# Patient Record
Sex: FEMALE | Race: WHITE | NOT HISPANIC OR LATINO | URBAN - METROPOLITAN AREA
[De-identification: names, ages, dates, MRNs, and addresses within clinical notes are randomized per-mention and may not be internally consistent; named-entity substitution may affect disease eponyms.]

---

## 2018-09-18 ENCOUNTER — APPOINTMENT (RX ONLY)
Dept: URBAN - METROPOLITAN AREA CLINIC 4 | Facility: CLINIC | Age: 68
Setting detail: DERMATOLOGY
End: 2018-09-18

## 2018-09-18 DIAGNOSIS — L57.0 ACTINIC KERATOSIS: ICD-10-CM

## 2018-09-18 DIAGNOSIS — D18.0 HEMANGIOMA: ICD-10-CM

## 2018-09-18 DIAGNOSIS — D22 MELANOCYTIC NEVI: ICD-10-CM

## 2018-09-18 DIAGNOSIS — L81.4 OTHER MELANIN HYPERPIGMENTATION: ICD-10-CM

## 2018-09-18 DIAGNOSIS — L82.1 OTHER SEBORRHEIC KERATOSIS: ICD-10-CM

## 2018-09-18 DIAGNOSIS — Z71.89 OTHER SPECIFIED COUNSELING: ICD-10-CM

## 2018-09-18 PROBLEM — D22.5 MELANOCYTIC NEVI OF TRUNK: Status: ACTIVE | Noted: 2018-09-18

## 2018-09-18 PROBLEM — D18.01 HEMANGIOMA OF SKIN AND SUBCUTANEOUS TISSUE: Status: ACTIVE | Noted: 2018-09-18

## 2018-09-18 PROCEDURE — 17000 DESTRUCT PREMALG LESION: CPT

## 2018-09-18 PROCEDURE — ? LIQUID NITROGEN

## 2018-09-18 PROCEDURE — ? COUNSELING

## 2018-09-18 PROCEDURE — 99213 OFFICE O/P EST LOW 20 MIN: CPT | Mod: 25

## 2018-09-18 ASSESSMENT — LOCATION DETAILED DESCRIPTION DERM
LOCATION DETAILED: RIGHT CLAVICULAR SKIN
LOCATION DETAILED: LEFT MEDIAL UPPER BACK
LOCATION DETAILED: EPIGASTRIC SKIN
LOCATION DETAILED: LEFT RIB CAGE
LOCATION DETAILED: LEFT MEDIAL SUPERIOR CHEST
LOCATION DETAILED: LEFT LATERAL MALAR CHEEK
LOCATION DETAILED: RIGHT LATERAL FOREHEAD

## 2018-09-18 ASSESSMENT — LOCATION SIMPLE DESCRIPTION DERM
LOCATION SIMPLE: LEFT CHEEK
LOCATION SIMPLE: RIGHT FOREHEAD
LOCATION SIMPLE: ABDOMEN
LOCATION SIMPLE: LEFT UPPER BACK
LOCATION SIMPLE: CHEST
LOCATION SIMPLE: RIGHT CLAVICULAR SKIN

## 2018-09-18 ASSESSMENT — LOCATION ZONE DERM
LOCATION ZONE: FACE
LOCATION ZONE: TRUNK

## 2020-01-23 ENCOUNTER — HOSPITAL ENCOUNTER (OUTPATIENT)
Facility: MEDICAL CENTER | Age: 70
End: 2020-01-23
Attending: ORTHOPAEDIC SURGERY | Admitting: ORTHOPAEDIC SURGERY
Payer: MEDICARE

## 2020-01-23 ENCOUNTER — ANESTHESIA (OUTPATIENT)
Dept: SURGERY | Facility: MEDICAL CENTER | Age: 70
End: 2020-01-23
Payer: MEDICARE

## 2020-01-23 ENCOUNTER — ANESTHESIA EVENT (OUTPATIENT)
Dept: SURGERY | Facility: MEDICAL CENTER | Age: 70
End: 2020-01-23
Payer: MEDICARE

## 2020-01-23 VITALS
SYSTOLIC BLOOD PRESSURE: 112 MMHG | HEART RATE: 65 BPM | DIASTOLIC BLOOD PRESSURE: 63 MMHG | HEIGHT: 64 IN | RESPIRATION RATE: 18 BRPM | BODY MASS INDEX: 23.26 KG/M2 | WEIGHT: 136.24 LBS | OXYGEN SATURATION: 97 % | TEMPERATURE: 97.3 F

## 2020-01-23 PROBLEM — M65.30 TRIGGER FINGER: Status: ACTIVE | Noted: 2020-01-23

## 2020-01-23 PROCEDURE — A6454 SELF-ADHER BAND W>=3" <5"/YD: HCPCS | Performed by: ORTHOPAEDIC SURGERY

## 2020-01-23 PROCEDURE — 160035 HCHG PACU - 1ST 60 MINS PHASE I: Performed by: ORTHOPAEDIC SURGERY

## 2020-01-23 PROCEDURE — 160025 RECOVERY II MINUTES (STATS): Performed by: ORTHOPAEDIC SURGERY

## 2020-01-23 PROCEDURE — 700111 HCHG RX REV CODE 636 W/ 250 OVERRIDE (IP)

## 2020-01-23 PROCEDURE — 700111 HCHG RX REV CODE 636 W/ 250 OVERRIDE (IP): Performed by: ANESTHESIOLOGY

## 2020-01-23 PROCEDURE — 160028 HCHG SURGERY MINUTES - 1ST 30 MINS LEVEL 3: Performed by: ORTHOPAEDIC SURGERY

## 2020-01-23 PROCEDURE — 160002 HCHG RECOVERY MINUTES (STAT): Performed by: ORTHOPAEDIC SURGERY

## 2020-01-23 PROCEDURE — 700105 HCHG RX REV CODE 258: Performed by: ORTHOPAEDIC SURGERY

## 2020-01-23 PROCEDURE — 160048 HCHG OR STATISTICAL LEVEL 1-5: Performed by: ORTHOPAEDIC SURGERY

## 2020-01-23 PROCEDURE — 160009 HCHG ANES TIME/MIN: Performed by: ORTHOPAEDIC SURGERY

## 2020-01-23 PROCEDURE — 700101 HCHG RX REV CODE 250: Performed by: ORTHOPAEDIC SURGERY

## 2020-01-23 PROCEDURE — 501838 HCHG SUTURE GENERAL: Performed by: ORTHOPAEDIC SURGERY

## 2020-01-23 PROCEDURE — 160046 HCHG PACU - 1ST 60 MINS PHASE II: Performed by: ORTHOPAEDIC SURGERY

## 2020-01-23 RX ORDER — ONDANSETRON 2 MG/ML
4 INJECTION INTRAMUSCULAR; INTRAVENOUS
Status: DISCONTINUED | OUTPATIENT
Start: 2020-01-23 | End: 2020-01-23 | Stop reason: HOSPADM

## 2020-01-23 RX ORDER — LORAZEPAM 2 MG/ML
0.5 INJECTION INTRAMUSCULAR
Status: DISCONTINUED | OUTPATIENT
Start: 2020-01-23 | End: 2020-01-23 | Stop reason: HOSPADM

## 2020-01-23 RX ORDER — SODIUM CHLORIDE, SODIUM LACTATE, POTASSIUM CHLORIDE, CALCIUM CHLORIDE 600; 310; 30; 20 MG/100ML; MG/100ML; MG/100ML; MG/100ML
INJECTION, SOLUTION INTRAVENOUS CONTINUOUS
Status: DISCONTINUED | OUTPATIENT
Start: 2020-01-23 | End: 2020-01-23 | Stop reason: HOSPADM

## 2020-01-23 RX ORDER — LABETALOL HYDROCHLORIDE 5 MG/ML
5 INJECTION, SOLUTION INTRAVENOUS
Status: DISCONTINUED | OUTPATIENT
Start: 2020-01-23 | End: 2020-01-23 | Stop reason: HOSPADM

## 2020-01-23 RX ORDER — MEPERIDINE HYDROCHLORIDE 25 MG/ML
12.5 INJECTION INTRAMUSCULAR; INTRAVENOUS; SUBCUTANEOUS
Status: DISCONTINUED | OUTPATIENT
Start: 2020-01-23 | End: 2020-01-23 | Stop reason: HOSPADM

## 2020-01-23 RX ORDER — LIDOCAINE HYDROCHLORIDE 10 MG/ML
INJECTION, SOLUTION EPIDURAL; INFILTRATION; INTRACAUDAL; PERINEURAL
Status: COMPLETED
Start: 2020-01-23 | End: 2020-01-23

## 2020-01-23 RX ORDER — HYDROMORPHONE HYDROCHLORIDE 1 MG/ML
0.2 INJECTION, SOLUTION INTRAMUSCULAR; INTRAVENOUS; SUBCUTANEOUS
Status: DISCONTINUED | OUTPATIENT
Start: 2020-01-23 | End: 2020-01-23 | Stop reason: HOSPADM

## 2020-01-23 RX ORDER — CEFAZOLIN SODIUM 1 G/3ML
INJECTION, POWDER, FOR SOLUTION INTRAMUSCULAR; INTRAVENOUS PRN
Status: DISCONTINUED | OUTPATIENT
Start: 2020-01-23 | End: 2020-01-23 | Stop reason: SURG

## 2020-01-23 RX ORDER — ONDANSETRON 2 MG/ML
INJECTION INTRAMUSCULAR; INTRAVENOUS PRN
Status: DISCONTINUED | OUTPATIENT
Start: 2020-01-23 | End: 2020-01-23 | Stop reason: SURG

## 2020-01-23 RX ORDER — HYDROMORPHONE HYDROCHLORIDE 1 MG/ML
0.4 INJECTION, SOLUTION INTRAMUSCULAR; INTRAVENOUS; SUBCUTANEOUS
Status: DISCONTINUED | OUTPATIENT
Start: 2020-01-23 | End: 2020-01-23 | Stop reason: HOSPADM

## 2020-01-23 RX ORDER — KETOROLAC TROMETHAMINE 30 MG/ML
INJECTION, SOLUTION INTRAMUSCULAR; INTRAVENOUS PRN
Status: DISCONTINUED | OUTPATIENT
Start: 2020-01-23 | End: 2020-01-23 | Stop reason: SURG

## 2020-01-23 RX ORDER — HALOPERIDOL 5 MG/ML
1 INJECTION INTRAMUSCULAR
Status: DISCONTINUED | OUTPATIENT
Start: 2020-01-23 | End: 2020-01-23 | Stop reason: HOSPADM

## 2020-01-23 RX ORDER — HYDROMORPHONE HYDROCHLORIDE 1 MG/ML
0.1 INJECTION, SOLUTION INTRAMUSCULAR; INTRAVENOUS; SUBCUTANEOUS
Status: DISCONTINUED | OUTPATIENT
Start: 2020-01-23 | End: 2020-01-23 | Stop reason: HOSPADM

## 2020-01-23 RX ORDER — BUPIVACAINE HYDROCHLORIDE 5 MG/ML
INJECTION, SOLUTION EPIDURAL; INTRACAUDAL
Status: DISCONTINUED | OUTPATIENT
Start: 2020-01-23 | End: 2020-01-23 | Stop reason: HOSPADM

## 2020-01-23 RX ORDER — HYDRALAZINE HYDROCHLORIDE 20 MG/ML
5 INJECTION INTRAMUSCULAR; INTRAVENOUS
Status: DISCONTINUED | OUTPATIENT
Start: 2020-01-23 | End: 2020-01-23 | Stop reason: HOSPADM

## 2020-01-23 RX ADMIN — ONDANSETRON 4 MG: 2 INJECTION INTRAMUSCULAR; INTRAVENOUS at 08:04

## 2020-01-23 RX ADMIN — KETOROLAC TROMETHAMINE 30 MG: 30 INJECTION, SOLUTION INTRAMUSCULAR at 08:04

## 2020-01-23 RX ADMIN — LIDOCAINE HYDROCHLORIDE 0.5 ML: 10 INJECTION, SOLUTION EPIDURAL; INFILTRATION; INTRACAUDAL; PERINEURAL at 07:00

## 2020-01-23 RX ADMIN — Medication 0.5 ML: at 07:00

## 2020-01-23 RX ADMIN — SODIUM CHLORIDE, POTASSIUM CHLORIDE, SODIUM LACTATE AND CALCIUM CHLORIDE: 600; 310; 30; 20 INJECTION, SOLUTION INTRAVENOUS at 07:23

## 2020-01-23 RX ADMIN — PROPOFOL 50 MG: 10 INJECTION, EMULSION INTRAVENOUS at 07:56

## 2020-01-23 RX ADMIN — PROPOFOL 50 MG: 10 INJECTION, EMULSION INTRAVENOUS at 07:59

## 2020-01-23 RX ADMIN — CEFAZOLIN 2 G: 330 INJECTION, POWDER, FOR SOLUTION INTRAMUSCULAR; INTRAVENOUS at 07:56

## 2020-01-23 RX ADMIN — PROPOFOL 50 MG: 10 INJECTION, EMULSION INTRAVENOUS at 08:04

## 2020-01-23 RX ADMIN — PROPOFOL 50 MG: 10 INJECTION, EMULSION INTRAVENOUS at 07:54

## 2020-01-23 ASSESSMENT — PAIN SCALES - GENERAL: PAIN_LEVEL: 0

## 2020-01-23 NOTE — ANESTHESIA POSTPROCEDURE EVALUATION
Patient: Jovana Black    Procedure Summary     Date:  01/23/20 Room / Location:  [place] [address] / SURGERY [facility]    Anesthesia Start:  0752 Anesthesia Stop:  0812    Procedure:  RELEASE, TRIGGER FINGER-MIDDLE (Left Finger) Diagnosis:  (TRIGGER FINGER LEFT MIDDLE)    Surgeon:  Daniel Gonzalez M.D. Responsible Provider:  Valentina Coburn M.D.    Anesthesia Type:  MAC ASA Status:  1          Final Anesthesia Type: MAC  Last vitals  BP   Blood Pressure : 112/63    Temp   36.3 °C (97.3 °F)    Pulse   Pulse: 65   Resp   18    SpO2   97 %      Anesthesia Post Evaluation    Patient location during evaluation: PACU  Patient participation: complete - patient participated  Level of consciousness: awake and alert  Pain score: 0    Airway patency: patent  Anesthetic complications: no  Cardiovascular status: adequate  Respiratory status: acceptable  Hydration status: acceptable    PONV: none           Nurse Pain Score: 0 (NPRS)

## 2020-01-23 NOTE — ANESTHESIA PREPROCEDURE EVALUATION
Relevant Problems   No relevant active problems       Physical Exam    Airway   Mallampati: I  TM distance: >3 FB       Cardiovascular - normal exam     Dental    Pulmonary   Breath sounds clear to auscultation     Abdominal    Neurological - normal exam                 Anesthesia Plan    ASA 1       Plan - MAC             Induction: intravenous      Pertinent diagnostic labs and testing reviewed    Informed Consent:    Anesthetic plan and risks discussed with patient.

## 2020-01-23 NOTE — PROGRESS NOTES
Patient received from OR at 0810, bedside report received from anesthesia/OR RN, 2 patient identifiers confirmed . Patient connected to monitors, assessed for general head to toe and pain/nausea. Ordered reviewed and checked. family updated via telephone on patient status.  VSS, declines pain, awake and alert, patient meets criteria for D/C to phase II. Report called to Yumiko GILMAN.

## 2020-01-23 NOTE — ANESTHESIA QCDR
2019 [facility] Clinical Data Registry (for Quality Improvement)     Postoperative nausea/vomiting risk protocol (Adult = 18 yrs and Pediatric 3-17 yrs)- (430 and 463)  General inhalation anesthetic (NOT TIVA) with PONV risk factors: No  Provision of anti-emetic therapy with at least 2 different classes of agents: N/A  Patient DID NOT receive anti-emetic therapy and reason is documented in Medical Record: N/A    Multimodal Pain Management- (477)  Non-emergent surgery AND patient age >= 18: Yes  Use of Multimodal Pain Management, two or more drugs and/or interventions, NOT including systemic opioids: Yes  Exception: Documented allergy to multiple classes of analgesics: N/A    Smoking Abstinence (404)  Patient is current smoker (cigarette, pipe, e-cig, marijuanna): No  Elective Surgery:   Abstinence instructions provided prior to day of surgery:   Patient abstained from smoking on day of surgery:     Pre-Op Beta-Blocker in Isolated CABG (44)  Isolated CABG AND patient age >= 18: No  Beta-blocker admin within 24 hours of surgical incision:   Exception:of medical reason(s) for not administering beta blocker within 24 hours prior to surgical incision (e.g., not  indicated,other medical reason):     PACU assessment of acute postoperative pain prior to Anesthesia Care End- Applies to Patients Age = 18- (ABG7)  Initial PACU pain score is which of the following: < 7/10  Patient unable to report pain score: N/A    Post-anesthetic transfer of care checklist/protocol to PACU/ICU- (426 and 427)  Upon conclusion of case, patient transferred to which of the following locations: PACU/Non-ICU  Use of transfer checklist/protocol: Yes  Exclusion: Service Performed in Patient Hospital Room (and thus did not require transfer): N/A  Unplanned admission to ICU related to anesthesia service up through end of PACU care- (MD51)  Unplanned admission to ICU (not initially anticipated at anesthesia start time): No

## 2020-01-23 NOTE — OR NURSING
Pt to phase 2 by gissel mccann, vss. Dressing to left hand is clean dry and intact. Pt able to move all fingers, cap refill is WNL.  at bedside, instructions and script given. To car by wheelchair.

## 2020-01-23 NOTE — ANESTHESIA TIME REPORT
Anesthesia Start and Stop Event Times     Date Time Event    1/23/2020 0742 Ready for Procedure     0752 Anesthesia Start     0812 Anesthesia Stop        Responsible Staff  01/23/20    Name Role Begin End    Valentina Coburn M.D. Anesth 0752 0812        Preop Diagnosis (Free Text):  Pre-op Diagnosis     TRIGGER FINGER LEFT MIDDLE        Preop Diagnosis (Codes):    Post op Diagnosis  Trigger finger      Premium Reason  Non-Premium    Comments:

## 2020-01-23 NOTE — DISCHARGE INSTRUCTIONS
ACTIVITY: Rest and take it easy for the first 24 hours.  A responsible adult is recommended to remain with you during that time.  It is normal to feel sleepy.  We encourage you to not do anything that requires balance, judgment or coordination.    MILD FLU-LIKE SYMPTOMS ARE NORMAL. YOU MAY EXPERIENCE GENERALIZED MUSCLE ACHES, THROAT IRRITATION, HEADACHE AND/OR SOME NAUSEA.    FOR 24 HOURS DO NOT:  Drive, operate machinery or run household appliances.  Drink beer or alcoholic beverages.   Make important decisions or sign legal documents.    SPECIAL INSTRUCTIONS: *FOLLOW ANY INSTRUCTIONS GIVEN TO YOU BY DR. PAZ.    See pain management handout    Elevate extremity   Encourage moving all fingers  Weight Bearing - As Tolerated      DIET: To avoid nausea, slowly advance diet as tolerated, avoiding spicy or greasy foods for the first day.  Add more substantial food to your diet according to your physician's instructions.  Babies can be fed formula or breast milk as soon as they are hungry.  INCREASE FLUIDS AND FIBER TO AVOID CONSTIPATION.    SURGICAL DRESSING/BATHING:   Keep dressing clean and dry   May remove dressing 5-6 days and shower       FOLLOW-UP APPOINTMENT:  A follow-up appointment should be arranged with your doctor; call to schedule  Dr. Paz [phone].    You should CALL YOUR PHYSICIAN if you develop:  Fever greater than 101 degrees F.  Pain not relieved by medication, or persistent nausea or vomiting.  Excessive bleeding (blood soaking through dressing) or unexpected drainage from the wound.  Extreme redness or swelling around the incision site, drainage of pus or foul smelling drainage.  Inability to urinate or empty your bladder within 8 hours.  Problems with breathing or chest pain.    You should call 911 if you develop problems with breathing or chest pain.  If you are unable to contact your doctor or surgical center, you should go to the nearest emergency room or urgent care center.   Physician's telephone #: Dr. Gonzalez [phone]    If any questions arise, call your doctor.  If your doctor is not available, please feel free to call the Surgical Center at [phone].  The Center is open Monday through Friday from 7AM to 7PM.  You can also call the HEALTH HOTLINE open 24 hours/day, 7 days/week and speak to a nurse at [phone], or toll free at [phone].    A registered nurse may call you a few days after your surgery to see how you are doing after your procedure.    MEDICATIONS: Resume taking daily medication.  Take prescribed pain medication with food.  If no medication is prescribed, you may take non-aspirin pain medication if needed.  PAIN MEDICATION CAN BE VERY CONSTIPATING.  Take a stool softener or laxative such as senokot, pericolace, or milk of magnesia if needed.    Prescription given for Ultram.  Last pain medication given at (no medication given)    If your physician has prescribed pain medication that includes Acetaminophen (Tylenol), do not take additional Acetaminophen (Tylenol) while taking the prescribed medication.    Depression / Suicide Risk    As you are discharged from this [facility] facility, it is important to learn how to keep safe from harming yourself.    Recognize the warning signs:  · Abrupt changes in personality, positive or negative- including increase in energy   · Giving away possessions  · Change in eating patterns- significant weight changes-  positive or negative  · Change in sleeping patterns- unable to sleep or sleeping all the time   · Unwillingness or inability to communicate  · Depression  · Unusual sadness, discouragement and loneliness  · Talk of wanting to die  · Neglect of personal appearance   · Rebelliousness- reckless behavior  · Withdrawal from people/activities they love  · Confusion- inability to concentrate     If you or a loved one observes any of these behaviors or has concerns about self-harm, here's what you can  do:  · Talk about it- your feelings and reasons for harming yourself  · Remove any means that you might use to hurt yourself (examples: pills, rope, extension cords, firearm)  · Get professional help from the community (Mental Health, Substance Abuse, psychological counseling)  · Do not be alone:Call your Safe Contact- someone whom you trust who will be there for you.  · Call your local CRISIS HOTLINE 951-1203 or 235-292-7552  · Call your local Children's Mobile Crisis Response Team [place] (744) 966-2115 or www.PharmAssistant  · Call the toll free National Suicide Prevention Hotlines   · National Suicide Prevention Lifeline 489-163-FOCR (2603)  · National Hope Line Network 800-SUICIDE (054-0668)

## 2020-01-23 NOTE — OR SURGEON
Immediate Post OP Note    PreOp Diagnosis: L mf trigger    PostOp Diagnosis: same    Procedure(s):  RELEASE, TRIGGER FINGER-MIDDLE    Surgeon(s):  Daniel Gonzalez M.D.    Anesthesiologist/Type of Anesthesia:  Anesthesiologist: Valentina Coburn M.D./* No anesthesia type entered *    Surgical Staff:  Circulator: Esther Pro R.N.  Scrub Person: Belia Davidson; Namrata Jasso R.N.    Specimens removed if any:  * No specimens in log *    Estimated Blood Loss: min    Findings: adequate release    Complications: none        1/23/2020 7:42 AM Daniel Gonzalez M.D.

## 2020-01-23 NOTE — OP REPORT
DATE OF SERVICE:  01/23/2020    PREOPERATIVE DIAGNOSIS:  Left middle trigger finger.    POSTOPERATIVE DIAGNOSIS:  Left middle trigger finger.    PROCEDURE:  Left middle trigger release.    SURGEON:  Daniel Gonzalez MD    ANESTHESIA:  IV sedation with local.    ESTIMATED BLOOD LOSS:  Minimal.    DRAINS:  None.    COMPLICATIONS:  None.    INDICATIONS:  Patient is a female with symptoms of trigger finger ____.  I   explained the risks, benefits, complications, and alternatives to surgery.    All questions were answered.  No guarantees were given.  Signed consent was   obtained.      DESCRIPTION OF PROCEDURE:  Patient was taken to the operating room, laid   supine on the operating table.  All bony prominences well padded.  Good IV   sedation was given.  Left upper extremity was prepped and draped in the usual   sterile fashion using ChloraPrep.  Limb was exsanguinated with elevation.    Tourniquet was raised.  Total tourniquet time was under 15 minutes.  The area   was infiltrated with Marcaine and lidocaine, both without epinephrine.  An   oblique incision was made over left middle finger, A1 pulley was identified,   sharply incised, carried proximally and distally, found to be a nice release   along its entirety.  The tendon tracked nicely with no locking, catching.    Wound was irrigated, closed with nylon in simple fashion.  Sterile dressing of   Xeroform, 4x4, Sof-Rol, bias was applied.  All needle, sponge counts were   correct.  Patient tolerated procedure well and was transferred to recovery in   stable condition.       ____________________________________     MD GAYATRI ORTEGA / SANDRA    DD:  01/23/2020 11:09:52  DT:  01/23/2020 11:41:06    D#:  [id number]  Job#:  [id number]

## 2020-02-18 ENCOUNTER — APPOINTMENT (RX ONLY)
Dept: URBAN - NONMETROPOLITAN AREA CLINIC 1 | Facility: CLINIC | Age: 70
Setting detail: DERMATOLOGY
End: 2020-02-18

## 2020-02-18 DIAGNOSIS — D22 MELANOCYTIC NEVI: ICD-10-CM

## 2020-02-18 DIAGNOSIS — L57.0 ACTINIC KERATOSIS: ICD-10-CM

## 2020-02-18 DIAGNOSIS — D485 NEOPLASM OF UNCERTAIN BEHAVIOR OF SKIN: ICD-10-CM

## 2020-02-18 DIAGNOSIS — D18.0 HEMANGIOMA: ICD-10-CM

## 2020-02-18 PROBLEM — D22.71 MELANOCYTIC NEVI OF RIGHT LOWER LIMB, INCLUDING HIP: Status: ACTIVE | Noted: 2020-02-18

## 2020-02-18 PROBLEM — D48.5 NEOPLASM OF UNCERTAIN BEHAVIOR OF SKIN: Status: ACTIVE | Noted: 2020-02-18

## 2020-02-18 PROBLEM — D22.5 MELANOCYTIC NEVI OF TRUNK: Status: ACTIVE | Noted: 2020-02-18

## 2020-02-18 PROBLEM — D18.01 HEMANGIOMA OF SKIN AND SUBCUTANEOUS TISSUE: Status: ACTIVE | Noted: 2020-02-18

## 2020-02-18 PROCEDURE — 17000 DESTRUCT PREMALG LESION: CPT | Mod: 59

## 2020-02-18 PROCEDURE — ? COUNSELING

## 2020-02-18 PROCEDURE — ? LIQUID NITROGEN

## 2020-02-18 PROCEDURE — 99203 OFFICE O/P NEW LOW 30 MIN: CPT | Mod: 25

## 2020-02-18 PROCEDURE — 17003 DESTRUCT PREMALG LES 2-14: CPT

## 2020-02-18 PROCEDURE — ? OBSERVATION

## 2020-02-18 PROCEDURE — ? BIOPSY BY SHAVE METHOD

## 2020-02-18 PROCEDURE — 11102 TANGNTL BX SKIN SINGLE LES: CPT

## 2020-02-18 ASSESSMENT — LOCATION ZONE DERM
LOCATION ZONE: TRUNK
LOCATION ZONE: LEG
LOCATION ZONE: EAR
LOCATION ZONE: FACE
LOCATION ZONE: TOE

## 2020-02-18 ASSESSMENT — LOCATION DETAILED DESCRIPTION DERM
LOCATION DETAILED: PERIUMBILICAL SKIN
LOCATION DETAILED: RIGHT SUPERIOR HELIX
LOCATION DETAILED: RIGHT INFERIOR MEDIAL LOWER BACK
LOCATION DETAILED: LEFT SUPERIOR FOREHEAD
LOCATION DETAILED: RIGHT INFERIOR LATERAL FOREHEAD
LOCATION DETAILED: RIGHT PROXIMAL PRETIBIAL REGION
LOCATION DETAILED: RIGHT DORSAL 3RD TOE

## 2020-02-18 ASSESSMENT — LOCATION SIMPLE DESCRIPTION DERM
LOCATION SIMPLE: ABDOMEN
LOCATION SIMPLE: RIGHT EAR
LOCATION SIMPLE: RIGHT 3RD TOE
LOCATION SIMPLE: LEFT FOREHEAD
LOCATION SIMPLE: RIGHT FOREHEAD
LOCATION SIMPLE: RIGHT LOWER BACK
LOCATION SIMPLE: RIGHT PRETIBIAL REGION

## 2020-02-18 NOTE — PROCEDURE: BIOPSY BY SHAVE METHOD
Body Location Override (Optional - Billing Will Still Be Based On Selected Body Map Location If Applicable): R medial lower leg
Detail Level: Detailed
Depth Of Biopsy: dermis
Was A Bandage Applied: Yes
Size Of Lesion In Cm: 0.3
X Size Of Lesion In Cm: 0.2
Biopsy Type: H and E
Biopsy Method: Dermablade
Anesthesia Type: 1% lidocaine with epinephrine and a 1:10 solution of 8.4% sodium bicarbonate
Anesthesia Volume In Cc: 1
Additional Anesthesia Volume In Cc (Will Not Render If 0): 0
Hemostasis: Drysol
Wound Care: Vaseline
Dressing: Band-Aid
Destruction After The Procedure: No
Type Of Destruction Used: Electrodesiccation and Curettage
Curettage Text: The wound bed was treated with curettage after the biopsy was done.
Cryotherapy Text: The wound bed was treated with cryotherapy after the biopsy was performed.
Electrodesiccation Text: The wound bed was treated with electrodesiccation after the biopsy was performed.
Electrodesiccation And Curettage Text: The wound bed was treated with electrodesiccation and curettage after the biopsy was performed.
Silver Nitrate Text: The wound bed was treated with silver nitrate after the biopsy was performed.
Lab: [id number]
Lab Facility: [id number]
Consent: Written consent was obtained and risks were reviewed including but not limited to scarring, infection, bleeding, scabbing, incomplete removal, nerve damage and allergy to anesthesia.
Post-Care Instructions: I reviewed with the patient in detail post-care instructions. Patient is to keep the biopsy site dry overnight, and then apply Polysporin twice daily until healed. Patient may apply hydrogen peroxide soaks to remove any crusting.
Notification Instructions: Patient will be notified of biopsy results. However, patient instructed to call the office if not contacted within 2 weeks.
Billing Type: Third-Party Bill

## 2020-02-18 NOTE — PROCEDURE: LIQUID NITROGEN
Render Note In Bullet Format When Appropriate: No
Number Of Freeze-Thaw Cycles: 2 freeze-thaw cycles
Post-Care Instructions: I reviewed with the patient in detail post-care instructions. Patient is to wear sunprotection, and avoid picking at any of the treated lesions. Pt may apply Vaseline to crusted or scabbing areas.
Consent: The patient's consent was obtained including but not limited to risks of crusting, scabbing, blistering, scarring, darker or lighter pigmentary change, recurrence, incomplete removal and infection.
Duration Of Freeze Thaw-Cycle (Seconds): 0
Detail Level: Simple

## 2020-02-18 NOTE — PROCEDURE: OBSERVATION
Body Location Override (Optional - Billing Will Still Be Based On Selected Body Map Location If Applicable): Mid upper abdomen
Detail Level: Detailed
Size Of Lesion In Cm (Optional): 0.3
X Size Of Lesion In Cm (Optional): 0.2
Morphology Per Location (Optional): Even brown macule
Body Location Override (Optional - Billing Will Still Be Based On Selected Body Map Location If Applicable): R dorsal 3rd toe

## 2020-03-18 ENCOUNTER — APPOINTMENT (RX ONLY)
Dept: URBAN - NONMETROPOLITAN AREA CLINIC 1 | Facility: CLINIC | Age: 70
Setting detail: DERMATOLOGY
End: 2020-03-18

## 2020-03-18 DIAGNOSIS — D485 NEOPLASM OF UNCERTAIN BEHAVIOR OF SKIN: ICD-10-CM

## 2020-03-18 PROBLEM — D48.5 NEOPLASM OF UNCERTAIN BEHAVIOR OF SKIN: Status: ACTIVE | Noted: 2020-03-18

## 2020-03-18 PROCEDURE — ? COUNSELING

## 2020-03-18 PROCEDURE — ? EXCISION

## 2020-03-18 PROCEDURE — ? PRESCRIPTION

## 2020-03-18 PROCEDURE — 11402 EXC TR-EXT B9+MARG 1.1-2 CM: CPT

## 2020-03-18 ASSESSMENT — LOCATION ZONE DERM: LOCATION ZONE: LEG

## 2020-03-18 ASSESSMENT — LOCATION DETAILED DESCRIPTION DERM: LOCATION DETAILED: RIGHT MEDIAL PROXIMAL PRETIBIAL REGION

## 2020-03-18 ASSESSMENT — LOCATION SIMPLE DESCRIPTION DERM: LOCATION SIMPLE: RIGHT PRETIBIAL REGION

## 2020-03-18 NOTE — PROCEDURE: EXCISION
Medical Necessity Information: It is in your best interest to select a reason for this procedure from the list below. All of these items fulfill various CMS LCD requirements except lesion extends to a margin.
Include Z78.9 (Other Specified Conditions Influencing Health Status) As An Associated Diagnosis?: No
Medical Necessity Clause: This procedure was medically necessary because the lesion that was treated was:
Lab: [id number]
Lab Facility: [id number]
Body Location Override (Optional - Billing Will Still Be Based On Selected Body Map Location If Applicable): right medial lower leg
Previous Accession (Optional): [id number]
Date Of Previous Biopsy (Optional): 2/18/2020
Biopsy Photograph Reviewed: Yes
Size Of Lesion In Cm: 0.7
X Size Of Lesion In Cm (Optional): 0.5
Anesthesia Volume In Cc: 9
Excision Method: Fusiform
Repair Type: None (Simple)
Intermediate / Complex Repair - Final Wound Length In Cm: 4
Undermining Type: Entire Wound
Debridement Text: The wound edges were debrided prior to proceeding with the closure to facilitate wound healing.
Helical Rim Text: The closure involved the helical rim.
Vermilion Border Text: The closure involved the vermilion border.
Nostril Rim Text: The closure involved the nostril rim.
Retention Suture Text: Retention sutures were placed to support the closure and prevent dehiscence.
Primary Defect Length (In Cm): 0
Suture Removal: 14 days
Epidermal Closure Graft Donor Site (Optional): simple interrupted
Graft Donor Site Bandage (Optional-Leave Blank If You Don't Want In Note): Steri-strips and a pressure bandage were applied to the donor site.
Detail Level: Detailed
Excision Depth: adipose tissue
Scalpel Size: 15 blade
Anesthesia Type: 1% lidocaine with epinephrine and a 1:10 solution of 8.4% sodium bicarbonate
Additional Anesthesia Volume In Cc: 6
Hemostasis: Electrocautery
Estimated Blood Loss (Cc): minimal
Epidermal Sutures: 4-0 Prolene
Wound Care: Polysporin ointment
Dressing: dry sterile dressing
Complex Repair Preamble Text (Leave Blank If You Do Not Want): Extensive wide undermining was performed.
Intermediate Repair Preamble Text (Leave Blank If You Do Not Want): Undermining was performed with blunt dissection.
Fusiform Excision Additional Text (Leave Blank If You Do Not Want): The margin was drawn around the clinically apparent lesion.  A fusiform shape was then drawn on the skin incorporating the lesion and margins.  Incisions were then made along these lines to the appropriate tissue plane and the lesion was extirpated.
Eliptical Excision Additional Text (Leave Blank If You Do Not Want): The margin was drawn around the clinically apparent lesion.  An elliptical shape was then drawn on the skin incorporating the lesion and margins.  Incisions were then made along these lines to the appropriate tissue plane and the lesion was extirpated.
Saucerization Excision Additional Text (Leave Blank If You Do Not Want): The margin was drawn around the clinically apparent lesion.  Incisions were then made along these lines, in a tangential fashion, to the appropriate tissue plane and the lesion was extirpated.
Slit Excision Additional Text (Leave Blank If You Do Not Want): A linear line was drawn on the skin overlying the lesion. An incision was made slowly until the lesion was visualized.  Once visualized, the lesion was removed with blunt dissection.
Excisional Biopsy Additional Text (Leave Blank If You Do Not Want): The margin was drawn around the clinically apparent lesion. An elliptical shape was then drawn on the skin incorporating the lesion and margins.  Incisions were then made along these lines to the appropriate tissue plane and the lesion was extirpated.
Perilesional Excision Additional Text (Leave Blank If You Do Not Want): The margin was drawn around the clinically apparent lesion. Incisions were then made along these lines to the appropriate tissue plane and the lesion was extirpated.
Repair Performed By Another Provider Text (Leave Blank If You Do Not Want): After the tissue was excised the defect was repaired by another provider.
No Repair - Repaired With Adjacent Surgical Defect Text (Leave Blank If You Do Not Want): After the excision the defect was repaired concurrently with another surgical defect which was in close approximation.
Advancement Flap (Single) Text: The defect edges were debeveled with a #15 scalpel blade.  Given the location of the defect and the proximity to free margins a single advancement flap was deemed most appropriate.  Using a sterile surgical marker, an appropriate advancement flap was drawn incorporating the defect and placing the expected incisions within the relaxed skin tension lines where possible.    The area thus outlined was incised deep to adipose tissue with a #15 scalpel blade.  The skin margins were undermined to an appropriate distance in all directions utilizing iris scissors.
Advancement Flap (Double) Text: The defect edges were debeveled with a #15 scalpel blade.  Given the location of the defect and the proximity to free margins a double advancement flap was deemed most appropriate.  Using a sterile surgical marker, the appropriate advancement flaps were drawn incorporating the defect and placing the expected incisions within the relaxed skin tension lines where possible.    The area thus outlined was incised deep to adipose tissue with a #15 scalpel blade.  The skin margins were undermined to an appropriate distance in all directions utilizing iris scissors.
Burow's Advancement Flap Text: The defect edges were debeveled with a #15 scalpel blade.  Given the location of the defect and the proximity to free margins a Burow's advancement flap was deemed most appropriate.  Using a sterile surgical marker, the appropriate advancement flap was drawn incorporating the defect and placing the expected incisions within the relaxed skin tension lines where possible.    The area thus outlined was incised deep to adipose tissue with a #15 scalpel blade.  The skin margins were undermined to an appropriate distance in all directions utilizing iris scissors.
Chonodrocutaneous Helical Advancement Flap Text: The defect edges were debeveled with a #15 scalpel blade.  Given the location of the defect and the proximity to free margins a chondrocutaneous helical advancement flap was deemed most appropriate.  Using a sterile surgical marker, the appropriate advancement flap was drawn incorporating the defect and placing the expected incisions within the relaxed skin tension lines where possible.    The area thus outlined was incised deep to adipose tissue with a #15 scalpel blade.  The skin margins were undermined to an appropriate distance in all directions utilizing iris scissors.
Crescentic Advancement Flap Text: The defect edges were debeveled with a #15 scalpel blade.  Given the location of the defect and the proximity to free margins a crescentic advancement flap was deemed most appropriate.  Using a sterile surgical marker, the appropriate advancement flap was drawn incorporating the defect and placing the expected incisions within the relaxed skin tension lines where possible.    The area thus outlined was incised deep to adipose tissue with a #15 scalpel blade.  The skin margins were undermined to an appropriate distance in all directions utilizing iris scissors.
A-T Advancement Flap Text: The defect edges were debeveled with a #15 scalpel blade.  Given the location of the defect, shape of the defect and the proximity to free margins an A-T advancement flap was deemed most appropriate.  Using a sterile surgical marker, an appropriate advancement flap was drawn incorporating the defect and placing the expected incisions within the relaxed skin tension lines where possible.    The area thus outlined was incised deep to adipose tissue with a #15 scalpel blade.  The skin margins were undermined to an appropriate distance in all directions utilizing iris scissors.
O-T Advancement Flap Text: The defect edges were debeveled with a #15 scalpel blade.  Given the location of the defect, shape of the defect and the proximity to free margins an O-T advancement flap was deemed most appropriate.  Using a sterile surgical marker, an appropriate advancement flap was drawn incorporating the defect and placing the expected incisions within the relaxed skin tension lines where possible.    The area thus outlined was incised deep to adipose tissue with a #15 scalpel blade.  The skin margins were undermined to an appropriate distance in all directions utilizing iris scissors.
O-L Flap Text: The defect edges were debeveled with a #15 scalpel blade.  Given the location of the defect, shape of the defect and the proximity to free margins an O-L flap was deemed most appropriate.  Using a sterile surgical marker, an appropriate advancement flap was drawn incorporating the defect and placing the expected incisions within the relaxed skin tension lines where possible.    The area thus outlined was incised deep to adipose tissue with a #15 scalpel blade.  The skin margins were undermined to an appropriate distance in all directions utilizing iris scissors.
O-Z Flap Text: The defect edges were debeveled with a #15 scalpel blade.  Given the location of the defect, shape of the defect and the proximity to free margins an O-Z flap was deemed most appropriate.  Using a sterile surgical marker, an appropriate transposition flap was drawn incorporating the defect and placing the expected incisions within the relaxed skin tension lines where possible. The area thus outlined was incised deep to adipose tissue with a #15 scalpel blade.  The skin margins were undermined to an appropriate distance in all directions utilizing iris scissors.
Double O-Z Flap Text: The defect edges were debeveled with a #15 scalpel blade.  Given the location of the defect, shape of the defect and the proximity to free margins a Double O-Z flap was deemed most appropriate.  Using a sterile surgical marker, an appropriate transposition flap was drawn incorporating the defect and placing the expected incisions within the relaxed skin tension lines where possible. The area thus outlined was incised deep to adipose tissue with a #15 scalpel blade.  The skin margins were undermined to an appropriate distance in all directions utilizing iris scissors.
V-Y Flap Text: The defect edges were debeveled with a #15 scalpel blade.  Given the location of the defect, shape of the defect and the proximity to free margins a V-Y flap was deemed most appropriate.  Using a sterile surgical marker, an appropriate advancement flap was drawn incorporating the defect and placing the expected incisions within the relaxed skin tension lines where possible.    The area thus outlined was incised deep to adipose tissue with a #15 scalpel blade.  The skin margins were undermined to an appropriate distance in all directions utilizing iris scissors.
Mercedes Flap Text: The defect edges were debeveled with a #15 scalpel blade.  Given the location of the defect, shape of the defect and the proximity to free margins a Mercedes flap was deemed most appropriate.  Using a sterile surgical marker, an appropriate advancement flap was drawn incorporating the defect and placing the expected incisions within the relaxed skin tension lines where possible. The area thus outlined was incised deep to adipose tissue with a #15 scalpel blade.  The skin margins were undermined to an appropriate distance in all directions utilizing iris scissors.
Modified Advancement Flap Text: The defect edges were debeveled with a #15 scalpel blade.  Given the location of the defect, shape of the defect and the proximity to free margins a modified advancement flap was deemed most appropriate.  Using a sterile surgical marker, an appropriate advancement flap was drawn incorporating the defect and placing the expected incisions within the relaxed skin tension lines where possible.    The area thus outlined was incised deep to adipose tissue with a #15 scalpel blade.  The skin margins were undermined to an appropriate distance in all directions utilizing iris scissors.
Mucosal Advancement Flap Text: Given the location of the defect, shape of the defect and the proximity to free margins a mucosal advancement flap was deemed most appropriate. Incisions were made with a 15 blade scalpel in the appropriate fashion along the cutaneous vermillion border and the mucosal lip. The remaining actinically damaged mucosal tissue was excised.  The mucosal advancement flap was then elevated to the gingival sulcus with care taken to preserve the neurovascular structures and advanced into the primary defect. Care was taken to ensure that precise realignment of the vermilion border was achieved.
Hatchet Flap Text: The defect edges were debeveled with a #15 scalpel blade.  Given the location of the defect, shape of the defect and the proximity to free margins a hatchet flap was deemed most appropriate.  Using a sterile surgical marker, an appropriate hatchet flap was drawn incorporating the defect and placing the expected incisions within the relaxed skin tension lines where possible.    The area thus outlined was incised deep to adipose tissue with a #15 scalpel blade.  The skin margins were undermined to an appropriate distance in all directions utilizing iris scissors.
Rotation Flap Text: The defect edges were debeveled with a #15 scalpel blade.  Given the location of the defect, shape of the defect and the proximity to free margins a rotation flap was deemed most appropriate.  Using a sterile surgical marker, an appropriate rotation flap was drawn incorporating the defect and placing the expected incisions within the relaxed skin tension lines where possible.    The area thus outlined was incised deep to adipose tissue with a #15 scalpel blade.  The skin margins were undermined to an appropriate distance in all directions utilizing iris scissors.
Spiral Flap Text: The defect edges were debeveled with a #15 scalpel blade.  Given the location of the defect, shape of the defect and the proximity to free margins a spiral flap was deemed most appropriate.  Using a sterile surgical marker, an appropriate rotation flap was drawn incorporating the defect and placing the expected incisions within the relaxed skin tension lines where possible. The area thus outlined was incised deep to adipose tissue with a #15 scalpel blade.  The skin margins were undermined to an appropriate distance in all directions utilizing iris scissors.
Star Wedge Flap Text: The defect edges were debeveled with a #15 scalpel blade.  Given the location of the defect, shape of the defect and the proximity to free margins a star wedge flap was deemed most appropriate.  Using a sterile surgical marker, an appropriate rotation flap was drawn incorporating the defect and placing the expected incisions within the relaxed skin tension lines where possible. The area thus outlined was incised deep to adipose tissue with a #15 scalpel blade.  The skin margins were undermined to an appropriate distance in all directions utilizing iris scissors.
Transposition Flap Text: The defect edges were debeveled with a #15 scalpel blade.  Given the location of the defect and the proximity to free margins a transposition flap was deemed most appropriate.  Using a sterile surgical marker, an appropriate transposition flap was drawn incorporating the defect.    The area thus outlined was incised deep to adipose tissue with a #15 scalpel blade.  The skin margins were undermined to an appropriate distance in all directions utilizing iris scissors.
Muscle Hinge Flap Text: The defect edges were debeveled with a #15 scalpel blade.  Given the size, depth and location of the defect and the proximity to free margins a muscle hinge flap was deemed most appropriate.  Using a sterile surgical marker, an appropriate hinge flap was drawn incorporating the defect. The area thus outlined was incised with a #15 scalpel blade.  The skin margins were undermined to an appropriate distance in all directions utilizing iris scissors.
Melolabial Transposition Flap Text: The defect edges were debeveled with a #15 scalpel blade.  Given the location of the defect and the proximity to free margins a melolabial flap was deemed most appropriate.  Using a sterile surgical marker, an appropriate melolabial transposition flap was drawn incorporating the defect.    The area thus outlined was incised deep to adipose tissue with a #15 scalpel blade.  The skin margins were undermined to an appropriate distance in all directions utilizing iris scissors.
Rhombic Flap Text: The defect edges were debeveled with a #15 scalpel blade.  Given the location of the defect and the proximity to free margins a rhombic flap was deemed most appropriate.  Using a sterile surgical marker, an appropriate rhombic flap was drawn incorporating the defect.    The area thus outlined was incised deep to adipose tissue with a #15 scalpel blade.  The skin margins were undermined to an appropriate distance in all directions utilizing iris scissors.
Rhomboid Transposition Flap Text: The defect edges were debeveled with a #15 scalpel blade.  Given the location of the defect and the proximity to free margins a rhomboid transposition flap was deemed most appropriate.  Using a sterile surgical marker, an appropriate rhomboid flap was drawn incorporating the defect.    The area thus outlined was incised deep to adipose tissue with a #15 scalpel blade.  The skin margins were undermined to an appropriate distance in all directions utilizing iris scissors.
Bi-Rhombic Flap Text: The defect edges were debeveled with a #15 scalpel blade.  Given the location of the defect and the proximity to free margins a bi-rhombic flap was deemed most appropriate.  Using a sterile surgical marker, an appropriate rhombic flap was drawn incorporating the defect. The area thus outlined was incised deep to adipose tissue with a #15 scalpel blade.  The skin margins were undermined to an appropriate distance in all directions utilizing iris scissors.
Helical Rim Advancement Flap Text: The defect edges were debeveled with a #15 blade scalpel.  Given the location of the defect and the proximity to free margins (helical rim) a double helical rim advancement flap was deemed most appropriate.  Using a sterile surgical marker, the appropriate advancement flaps were drawn incorporating the defect and placing the expected incisions between the helical rim and antihelix where possible.  The area thus outlined was incised through and through with a #15 scalpel blade.  With a skin hook and iris scissors, the flaps were gently and sharply undermined and freed up.
Bilateral Helical Rim Advancement Flap Text: The defect edges were debeveled with a #15 blade scalpel.  Given the location of the defect and the proximity to free margins (helical rim) a bilateral helical rim advancement flap was deemed most appropriate.  Using a sterile surgical marker, the appropriate advancement flaps were drawn incorporating the defect and placing the expected incisions between the helical rim and antihelix where possible.  The area thus outlined was incised through and through with a #15 scalpel blade.  With a skin hook and iris scissors, the flaps were gently and sharply undermined and freed up.
Ear Star Wedge Flap Text: The defect edges were debeveled with a #15 blade scalpel.  Given the location of the defect and the proximity to free margins (helical rim) an ear star wedge flap was deemed most appropriate.  Using a sterile surgical marker, the appropriate flap was drawn incorporating the defect and placing the expected incisions between the helical rim and antihelix where possible.  The area thus outlined was incised through and through with a #15 scalpel blade.
Banner Transposition Flap Text: The defect edges were debeveled with a #15 scalpel blade.  Given the location of the defect and the proximity to free margins a Banner transposition flap was deemed most appropriate.  Using a sterile surgical marker, an appropriate flap drawn around the defect. The area thus outlined was incised deep to adipose tissue with a #15 scalpel blade.  The skin margins were undermined to an appropriate distance in all directions utilizing iris scissors.
Bilobed Flap Text: The defect edges were debeveled with a #15 scalpel blade.  Given the location of the defect and the proximity to free margins a bilobe flap was deemed most appropriate.  Using a sterile surgical marker, an appropriate bilobe flap drawn around the defect.    The area thus outlined was incised deep to adipose tissue with a #15 scalpel blade.  The skin margins were undermined to an appropriate distance in all directions utilizing iris scissors.
Bilobed Transposition Flap Text: The defect edges were debeveled with a #15 scalpel blade.  Given the location of the defect and the proximity to free margins a bilobed transposition flap was deemed most appropriate.  Using a sterile surgical marker, an appropriate bilobe flap drawn around the defect.    The area thus outlined was incised deep to adipose tissue with a #15 scalpel blade.  The skin margins were undermined to an appropriate distance in all directions utilizing iris scissors.
Trilobed Flap Text: The defect edges were debeveled with a #15 scalpel blade.  Given the location of the defect and the proximity to free margins a trilobed flap was deemed most appropriate.  Using a sterile surgical marker, an appropriate trilobed flap drawn around the defect.    The area thus outlined was incised deep to adipose tissue with a #15 scalpel blade.  The skin margins were undermined to an appropriate distance in all directions utilizing iris scissors.
Dorsal Nasal Flap Text: The defect edges were debeveled with a #15 scalpel blade.  Given the location of the defect and the proximity to free margins a dorsal nasal flap was deemed most appropriate.  Using a sterile surgical marker, an appropriate dorsal nasal flap was drawn around the defect.    The area thus outlined was incised deep to adipose tissue with a #15 scalpel blade.  The skin margins were undermined to an appropriate distance in all directions utilizing iris scissors.
Island Pedicle Flap Text: The defect edges were debeveled with a #15 scalpel blade.  Given the location of the defect, shape of the defect and the proximity to free margins an island pedicle advancement flap was deemed most appropriate.  Using a sterile surgical marker, an appropriate advancement flap was drawn incorporating the defect, outlining the appropriate donor tissue and placing the expected incisions within the relaxed skin tension lines where possible.    The area thus outlined was incised deep to adipose tissue with a #15 scalpel blade.  The skin margins were undermined to an appropriate distance in all directions around the primary defect and laterally outward around the island pedicle utilizing iris scissors.  There was minimal undermining beneath the pedicle flap.
Island Pedicle Flap With Canthal Suspension Text: The defect edges were debeveled with a #15 scalpel blade.  Given the location of the defect, shape of the defect and the proximity to free margins an island pedicle advancement flap was deemed most appropriate.  Using a sterile surgical marker, an appropriate advancement flap was drawn incorporating the defect, outlining the appropriate donor tissue and placing the expected incisions within the relaxed skin tension lines where possible. The area thus outlined was incised deep to adipose tissue with a #15 scalpel blade.  The skin margins were undermined to an appropriate distance in all directions around the primary defect and laterally outward around the island pedicle utilizing iris scissors.  There was minimal undermining beneath the pedicle flap. A suspension suture was placed in the canthal tendon to prevent tension and prevent ectropion.
Alar Island Pedicle Flap Text: The defect edges were debeveled with a #15 scalpel blade.  Given the location of the defect, shape of the defect and the proximity to the alar rim an island pedicle advancement flap was deemed most appropriate.  Using a sterile surgical marker, an appropriate advancement flap was drawn incorporating the defect, outlining the appropriate donor tissue and placing the expected incisions within the nasal ala running parallel to the alar rim. The area thus outlined was incised with a #15 scalpel blade.  The skin margins were undermined minimally to an appropriate distance in all directions around the primary defect and laterally outward around the island pedicle utilizing iris scissors.  There was minimal undermining beneath the pedicle flap.
Double Island Pedicle Flap Text: The defect edges were debeveled with a #15 scalpel blade.  Given the location of the defect, shape of the defect and the proximity to free margins a double island pedicle advancement flap was deemed most appropriate.  Using a sterile surgical marker, an appropriate advancement flap was drawn incorporating the defect, outlining the appropriate donor tissue and placing the expected incisions within the relaxed skin tension lines where possible.    The area thus outlined was incised deep to adipose tissue with a #15 scalpel blade.  The skin margins were undermined to an appropriate distance in all directions around the primary defect and laterally outward around the island pedicle utilizing iris scissors.  There was minimal undermining beneath the pedicle flap.
Island Pedicle Flap-Requiring Vessel Identification Text: The defect edges were debeveled with a #15 scalpel blade.  Given the location of the defect, shape of the defect and the proximity to free margins an island pedicle advancement flap was deemed most appropriate.  Using a sterile surgical marker, an appropriate advancement flap was drawn, based on the axial vessel mentioned above, incorporating the defect, outlining the appropriate donor tissue and placing the expected incisions within the relaxed skin tension lines where possible.    The area thus outlined was incised deep to adipose tissue with a #15 scalpel blade.  The skin margins were undermined to an appropriate distance in all directions around the primary defect and laterally outward around the island pedicle utilizing iris scissors.  There was minimal undermining beneath the pedicle flap.
Keystone Flap Text: The defect edges were debeveled with a #15 scalpel blade.  Given the location of the defect, shape of the defect a keystone flap was deemed most appropriate.  Using a sterile surgical marker, an appropriate keystone flap was drawn incorporating the defect, outlining the appropriate donor tissue and placing the expected incisions within the relaxed skin tension lines where possible. The area thus outlined was incised deep to adipose tissue with a #15 scalpel blade.  The skin margins were undermined to an appropriate distance in all directions around the primary defect and laterally outward around the flap utilizing iris scissors.
O-T Plasty Text: The defect edges were debeveled with a #15 scalpel blade.  Given the location of the defect, shape of the defect and the proximity to free margins an O-T plasty was deemed most appropriate.  Using a sterile surgical marker, an appropriate O-T plasty was drawn incorporating the defect and placing the expected incisions within the relaxed skin tension lines where possible.    The area thus outlined was incised deep to adipose tissue with a #15 scalpel blade.  The skin margins were undermined to an appropriate distance in all directions utilizing iris scissors.
O-Z Plasty Text: The defect edges were debeveled with a #15 scalpel blade.  Given the location of the defect, shape of the defect and the proximity to free margins an O-Z plasty (double transposition flap) was deemed most appropriate.  Using a sterile surgical marker, the appropriate transposition flaps were drawn incorporating the defect and placing the expected incisions within the relaxed skin tension lines where possible.    The area thus outlined was incised deep to adipose tissue with a #15 scalpel blade.  The skin margins were undermined to an appropriate distance in all directions utilizing iris scissors.  Hemostasis was achieved with electrocautery.  The flaps were then transposed into place, one clockwise and the other counterclockwise, and anchored with interrupted buried subcutaneous sutures.
Double O-Z Plasty Text: The defect edges were debeveled with a #15 scalpel blade.  Given the location of the defect, shape of the defect and the proximity to free margins a Double O-Z plasty (double transposition flap) was deemed most appropriate.  Using a sterile surgical marker, the appropriate transposition flaps were drawn incorporating the defect and placing the expected incisions within the relaxed skin tension lines where possible. The area thus outlined was incised deep to adipose tissue with a #15 scalpel blade.  The skin margins were undermined to an appropriate distance in all directions utilizing iris scissors.  Hemostasis was achieved with electrocautery.  The flaps were then transposed into place, one clockwise and the other counterclockwise, and anchored with interrupted buried subcutaneous sutures.
S Plasty Text: Given the location and shape of the defect, and the orientation of relaxed skin tension lines, an S-plasty was deemed most appropriate for repair.  Using a sterile surgical marker, the appropriate outline of the S-plasty was drawn, incorporating the defect and placing the expected incisions within the relaxed skin tension lines where possible.  The area thus outlined was incised deep to adipose tissue with a #15 scalpel blade.  The skin margins were undermined to an appropriate distance in all directions utilizing iris scissors. The skin flaps were advanced over the defect.  The opposing margins were then approximated with interrupted buried subcutaneous sutures.
V-Y Plasty Text: The defect edges were debeveled with a #15 scalpel blade.  Given the location of the defect, shape of the defect and the proximity to free margins an V-Y advancement flap was deemed most appropriate.  Using a sterile surgical marker, an appropriate advancement flap was drawn incorporating the defect and placing the expected incisions within the relaxed skin tension lines where possible.    The area thus outlined was incised deep to adipose tissue with a #15 scalpel blade.  The skin margins were undermined to an appropriate distance in all directions utilizing iris scissors.
H Plasty Text: Given the location of the defect, shape of the defect and the proximity to free margins a H-plasty was deemed most appropriate for repair.  Using a sterile surgical marker, the appropriate advancement arms of the H-plasty were drawn incorporating the defect and placing the expected incisions within the relaxed skin tension lines where possible. The area thus outlined was incised deep to adipose tissue with a #15 scalpel blade. The skin margins were undermined to an appropriate distance in all directions utilizing iris scissors.  The opposing advancement arms were then advanced into place in opposite direction and anchored with interrupted buried subcutaneous sutures.
W Plasty Text: The lesion was extirpated to the level of the fat with a #15 scalpel blade.  Given the location of the defect, shape of the defect and the proximity to free margins a W-plasty was deemed most appropriate for repair.  Using a sterile surgical marker, the appropriate transposition arms of the W-plasty were drawn incorporating the defect and placing the expected incisions within the relaxed skin tension lines where possible.    The area thus outlined was incised deep to adipose tissue with a #15 scalpel blade.  The skin margins were undermined to an appropriate distance in all directions utilizing iris scissors.  The opposing transposition arms were then transposed into place in opposite direction and anchored with interrupted buried subcutaneous sutures.
Z Plasty Text: The lesion was extirpated to the level of the fat with a #15 scalpel blade.  Given the location of the defect, shape of the defect and the proximity to free margins a Z-plasty was deemed most appropriate for repair.  Using a sterile surgical marker, the appropriate transposition arms of the Z-plasty were drawn incorporating the defect and placing the expected incisions within the relaxed skin tension lines where possible.    The area thus outlined was incised deep to adipose tissue with a #15 scalpel blade.  The skin margins were undermined to an appropriate distance in all directions utilizing iris scissors.  The opposing transposition arms were then transposed into place in opposite direction and anchored with interrupted buried subcutaneous sutures.
Cheek Interpolation Flap Text: A decision was made to reconstruct the defect utilizing an interpolation axial flap and a staged reconstruction.  A telfa template was made of the defect.  This telfa template was then used to outline the Cheek Interpolation flap.  The donor area for the pedicle flap was then injected with anesthesia.  The flap was excised through the skin and subcutaneous tissue down to the layer of the underlying musculature.  The interpolation flap was carefully excised within this deep plane to maintain its blood supply.  The edges of the donor site were undermined.   The donor site was closed in a primary fashion.  The pedicle was then rotated into position and sutured.  Once the tube was sutured into place, adequate blood supply was confirmed with blanching and refill.  The pedicle was then wrapped with xeroform gauze and dressed appropriately with a telfa and gauze bandage to ensure continued blood supply and protect the attached pedicle.
Cheek-To-Nose Interpolation Flap Text: A decision was made to reconstruct the defect utilizing an interpolation axial flap and a staged reconstruction.  A telfa template was made of the defect.  This telfa template was then used to outline the Cheek-To-Nose Interpolation flap.  The donor area for the pedicle flap was then injected with anesthesia.  The flap was excised through the skin and subcutaneous tissue down to the layer of the underlying musculature.  The interpolation flap was carefully excised within this deep plane to maintain its blood supply.  The edges of the donor site were undermined.   The donor site was closed in a primary fashion.  The pedicle was then rotated into position and sutured.  Once the tube was sutured into place, adequate blood supply was confirmed with blanching and refill.  The pedicle was then wrapped with xeroform gauze and dressed appropriately with a telfa and gauze bandage to ensure continued blood supply and protect the attached pedicle.
Interpolation Flap Text: A decision was made to reconstruct the defect utilizing an interpolation axial flap and a staged reconstruction.  A telfa template was made of the defect.  This telfa template was then used to outline the interpolation flap.  The donor area for the pedicle flap was then injected with anesthesia.  The flap was excised through the skin and subcutaneous tissue down to the layer of the underlying musculature.  The interpolation flap was carefully excised within this deep plane to maintain its blood supply.  The edges of the donor site were undermined.   The donor site was closed in a primary fashion.  The pedicle was then rotated into position and sutured.  Once the tube was sutured into place, adequate blood supply was confirmed with blanching and refill.  The pedicle was then wrapped with xeroform gauze and dressed appropriately with a telfa and gauze bandage to ensure continued blood supply and protect the attached pedicle.
Melolabial Interpolation Flap Text: A decision was made to reconstruct the defect utilizing an interpolation axial flap and a staged reconstruction.  A telfa template was made of the defect.  This telfa template was then used to outline the melolabial interpolation flap.  The donor area for the pedicle flap was then injected with anesthesia.  The flap was excised through the skin and subcutaneous tissue down to the layer of the underlying musculature.  The pedicle flap was carefully excised within this deep plane to maintain its blood supply.  The edges of the donor site were undermined.   The donor site was closed in a primary fashion.  The pedicle was then rotated into position and sutured.  Once the tube was sutured into place, adequate blood supply was confirmed with blanching and refill.  The pedicle was then wrapped with xeroform gauze and dressed appropriately with a telfa and gauze bandage to ensure continued blood supply and protect the attached pedicle.
Mastoid Interpolation Flap Text: A decision was made to reconstruct the defect utilizing an interpolation axial flap and a staged reconstruction.  A telfa template was made of the defect.  This telfa template was then used to outline the mastoid interpolation flap.  The donor area for the pedicle flap was then injected with anesthesia.  The flap was excised through the skin and subcutaneous tissue down to the layer of the underlying musculature.  The pedicle flap was carefully excised within this deep plane to maintain its blood supply.  The edges of the donor site were undermined.   The donor site was closed in a primary fashion.  The pedicle was then rotated into position and sutured.  Once the tube was sutured into place, adequate blood supply was confirmed with blanching and refill.  The pedicle was then wrapped with xeroform gauze and dressed appropriately with a telfa and gauze bandage to ensure continued blood supply and protect the attached pedicle.
Posterior Auricular Interpolation Flap Text: A decision was made to reconstruct the defect utilizing an interpolation axial flap and a staged reconstruction.  A telfa template was made of the defect.  This telfa template was then used to outline the posterior auricular interpolation flap.  The donor area for the pedicle flap was then injected with anesthesia.  The flap was excised through the skin and subcutaneous tissue down to the layer of the underlying musculature.  The pedicle flap was carefully excised within this deep plane to maintain its blood supply.  The edges of the donor site were undermined.   The donor site was closed in a primary fashion.  The pedicle was then rotated into position and sutured.  Once the tube was sutured into place, adequate blood supply was confirmed with blanching and refill.  The pedicle was then wrapped with xeroform gauze and dressed appropriately with a telfa and gauze bandage to ensure continued blood supply and protect the attached pedicle.
Paramedian Forehead Flap Text: A decision was made to reconstruct the defect utilizing an interpolation axial flap and a staged reconstruction.  A telfa template was made of the defect.  This telfa template was then used to outline the paramedian forehead pedicle flap.  The donor area for the pedicle flap was then injected with anesthesia.  The flap was excised through the skin and subcutaneous tissue down to the layer of the underlying musculature.  The pedicle flap was carefully excised within this deep plane to maintain its blood supply.  The edges of the donor site were undermined.   The donor site was closed in a primary fashion.  The pedicle was then rotated into position and sutured.  Once the tube was sutured into place, adequate blood supply was confirmed with blanching and refill.  The pedicle was then wrapped with xeroform gauze and dressed appropriately with a telfa and gauze bandage to ensure continued blood supply and protect the attached pedicle.
Lip Wedge Excision Repair Text: Given the location of the defect and the proximity to free margins a full thickness wedge repair was deemed most appropriate.  Using a sterile surgical marker, the appropriate repair was drawn incorporating the defect and placing the expected incisions perpendicular to the vermilion border.  The vermilion border was also meticulously outlined to ensure appropriate reapproximation during the repair.  The area thus outlined was incised through and through with a #15 scalpel blade.  The muscularis and dermis were reaproximated with deep sutures following hemostasis. Care was taken to realign the vermilion border before proceeding with the superficial closure.  Once the vermilion was realigned the superfical and mucosal closure was finished.
Ftsg Text: The defect edges were debeveled with a #15 scalpel blade.  Given the location of the defect, shape of the defect and the proximity to free margins a full thickness skin graft was deemed most appropriate.  Using a sterile surgical marker, the primary defect shape was transferred to the donor site. The area thus outlined was incised deep to adipose tissue with a #15 scalpel blade.  The harvested graft was then trimmed of adipose tissue until only dermis and epidermis was left.  The skin margins of the secondary defect were undermined to an appropriate distance in all directions utilizing iris scissors.  The secondary defect was closed with interrupted buried subcutaneous sutures.  The skin edges were then re-apposed with running  sutures.  The skin graft was then placed in the primary defect and oriented appropriately.
Split-Thickness Skin Graft Text: The defect edges were debeveled with a #15 scalpel blade.  Given the location of the defect, shape of the defect and the proximity to free margins a split thickness skin graft was deemed most appropriate.  Using a sterile surgical marker, the primary defect shape was transferred to the donor site. The split thickness graft was then harvested.  The skin graft was then placed in the primary defect and oriented appropriately.
Cartilage Graft Text: The defect edges were debeveled with a #15 scalpel blade.  Given the location of the defect, shape of the defect, the fact the defect involved a full thickness cartilage defect a cartilage graft was deemed most appropriate.  An appropriate donor site was identified, cleansed, and anesthetized. The cartilage graft was then harvested and transferred to the recipient site, oriented appropriately and then sutured into place.  The secondary defect was then repaired using a primary closure.
Composite Graft Text: The defect edges were debeveled with a #15 scalpel blade.  Given the location of the defect, shape of the defect, the proximity to free margins and the fact the defect was full thickness a composite graft was deemed most appropriate.  The defect was outline and then transferred to the donor site.  A full thickness graft was then excised from the donor site. The graft was then placed in the primary defect, oriented appropriately and then sutured into place.  The secondary defect was then repaired using a primary closure.
Epidermal Autograft Text: The defect edges were debeveled with a #15 scalpel blade.  Given the location of the defect, shape of the defect and the proximity to free margins an epidermal autograft was deemed most appropriate.  Using a sterile surgical marker, the primary defect shape was transferred to the donor site. The epidermal graft was then harvested.  The skin graft was then placed in the primary defect and oriented appropriately.
Dermal Autograft Text: The defect edges were debeveled with a #15 scalpel blade.  Given the location of the defect, shape of the defect and the proximity to free margins a dermal autograft was deemed most appropriate.  Using a sterile surgical marker, the primary defect shape was transferred to the donor site. The area thus outlined was incised deep to adipose tissue with a #15 scalpel blade.  The harvested graft was then trimmed of adipose and epidermal tissue until only dermis was left.  The skin graft was then placed in the primary defect and oriented appropriately.
Skin Substitute Text: The defect edges were debeveled with a #15 scalpel blade.  Given the location of the defect, shape of the defect and the proximity to free margins a skin substitute graft was deemed most appropriate.  The graft material was trimmed to fit the size of the defect. The graft was then placed in the primary defect and oriented appropriately.
Tissue Cultured Epidermal Autograft Text: The defect edges were debeveled with a #15 scalpel blade.  Given the location of the defect, shape of the defect and the proximity to free margins a tissue cultured epidermal autograft was deemed most appropriate.  The graft was then trimmed to fit the size of the defect.  The graft was then placed in the primary defect and oriented appropriately.
Xenograft Text: The defect edges were debeveled with a #15 scalpel blade.  Given the location of the defect, shape of the defect and the proximity to free margins a xenograft was deemed most appropriate.  The graft was then trimmed to fit the size of the defect.  The graft was then placed in the primary defect and oriented appropriately.
Purse String (Intermediate) Text: Given the location of the defect and the characteristics of the surrounding skin a pursestring intermediate closure was deemed most appropriate.  Undermining was performed circumfirentially around the surgical defect.  A purstring suture was then placed and tightened.
Purse String (Simple) Text: Given the location of the defect and the characteristics of the surrounding skin a purse string simple closure was deemed most appropriate.  Undermining was performed circumferentially around the surgical defect.  A purse string suture was then placed and tightened.
Complex Repair And Single Advancement Flap Text: The defect edges were debeveled with a #15 scalpel blade.  The primary defect was closed partially with a complex linear closure.  Given the location of the remaining defect, shape of the defect and the proximity to free margins a single advancement flap was deemed most appropriate for complete closure of the defect.  Using a sterile surgical marker, an appropriate advancement flap was drawn incorporating the defect and placing the expected incisions within the relaxed skin tension lines where possible.    The area thus outlined was incised deep to adipose tissue with a #15 scalpel blade.  The skin margins were undermined to an appropriate distance in all directions utilizing iris scissors.
Complex Repair And Double Advancement Flap Text: The defect edges were debeveled with a #15 scalpel blade.  The primary defect was closed partially with a complex linear closure.  Given the location of the remaining defect, shape of the defect and the proximity to free margins a double advancement flap was deemed most appropriate for complete closure of the defect.  Using a sterile surgical marker, an appropriate advancement flap was drawn incorporating the defect and placing the expected incisions within the relaxed skin tension lines where possible.    The area thus outlined was incised deep to adipose tissue with a #15 scalpel blade.  The skin margins were undermined to an appropriate distance in all directions utilizing iris scissors.
Complex Repair And Modified Advancement Flap Text: The defect edges were debeveled with a #15 scalpel blade.  The primary defect was closed partially with a complex linear closure.  Given the location of the remaining defect, shape of the defect and the proximity to free margins a modified advancement flap was deemed most appropriate for complete closure of the defect.  Using a sterile surgical marker, an appropriate advancement flap was drawn incorporating the defect and placing the expected incisions within the relaxed skin tension lines where possible.    The area thus outlined was incised deep to adipose tissue with a #15 scalpel blade.  The skin margins were undermined to an appropriate distance in all directions utilizing iris scissors.
Complex Repair And A-T Advancement Flap Text: The defect edges were debeveled with a #15 scalpel blade.  The primary defect was closed partially with a complex linear closure.  Given the location of the remaining defect, shape of the defect and the proximity to free margins an A-T advancement flap was deemed most appropriate for complete closure of the defect.  Using a sterile surgical marker, an appropriate advancement flap was drawn incorporating the defect and placing the expected incisions within the relaxed skin tension lines where possible.    The area thus outlined was incised deep to adipose tissue with a #15 scalpel blade.  The skin margins were undermined to an appropriate distance in all directions utilizing iris scissors.
Complex Repair And O-T Advancement Flap Text: The defect edges were debeveled with a #15 scalpel blade.  The primary defect was closed partially with a complex linear closure.  Given the location of the remaining defect, shape of the defect and the proximity to free margins an O-T advancement flap was deemed most appropriate for complete closure of the defect.  Using a sterile surgical marker, an appropriate advancement flap was drawn incorporating the defect and placing the expected incisions within the relaxed skin tension lines where possible.    The area thus outlined was incised deep to adipose tissue with a #15 scalpel blade.  The skin margins were undermined to an appropriate distance in all directions utilizing iris scissors.
Complex Repair And O-L Flap Text: The defect edges were debeveled with a #15 scalpel blade.  The primary defect was closed partially with a complex linear closure.  Given the location of the remaining defect, shape of the defect and the proximity to free margins an O-L flap was deemed most appropriate for complete closure of the defect.  Using a sterile surgical marker, an appropriate flap was drawn incorporating the defect and placing the expected incisions within the relaxed skin tension lines where possible.    The area thus outlined was incised deep to adipose tissue with a #15 scalpel blade.  The skin margins were undermined to an appropriate distance in all directions utilizing iris scissors.
Complex Repair And Bilobe Flap Text: The defect edges were debeveled with a #15 scalpel blade.  The primary defect was closed partially with a complex linear closure.  Given the location of the remaining defect, shape of the defect and the proximity to free margins a bilobe flap was deemed most appropriate for complete closure of the defect.  Using a sterile surgical marker, an appropriate advancement flap was drawn incorporating the defect and placing the expected incisions within the relaxed skin tension lines where possible.    The area thus outlined was incised deep to adipose tissue with a #15 scalpel blade.  The skin margins were undermined to an appropriate distance in all directions utilizing iris scissors.
Complex Repair And Melolabial Flap Text: The defect edges were debeveled with a #15 scalpel blade.  The primary defect was closed partially with a complex linear closure.  Given the location of the remaining defect, shape of the defect and the proximity to free margins a melolabial flap was deemed most appropriate for complete closure of the defect.  Using a sterile surgical marker, an appropriate advancement flap was drawn incorporating the defect and placing the expected incisions within the relaxed skin tension lines where possible.    The area thus outlined was incised deep to adipose tissue with a #15 scalpel blade.  The skin margins were undermined to an appropriate distance in all directions utilizing iris scissors.
Complex Repair And Rotation Flap Text: The defect edges were debeveled with a #15 scalpel blade.  The primary defect was closed partially with a complex linear closure.  Given the location of the remaining defect, shape of the defect and the proximity to free margins a rotation flap was deemed most appropriate for complete closure of the defect.  Using a sterile surgical marker, an appropriate advancement flap was drawn incorporating the defect and placing the expected incisions within the relaxed skin tension lines where possible.    The area thus outlined was incised deep to adipose tissue with a #15 scalpel blade.  The skin margins were undermined to an appropriate distance in all directions utilizing iris scissors.
Complex Repair And Rhombic Flap Text: The defect edges were debeveled with a #15 scalpel blade.  The primary defect was closed partially with a complex linear closure.  Given the location of the remaining defect, shape of the defect and the proximity to free margins a rhombic flap was deemed most appropriate for complete closure of the defect.  Using a sterile surgical marker, an appropriate advancement flap was drawn incorporating the defect and placing the expected incisions within the relaxed skin tension lines where possible.    The area thus outlined was incised deep to adipose tissue with a #15 scalpel blade.  The skin margins were undermined to an appropriate distance in all directions utilizing iris scissors.
Complex Repair And Transposition Flap Text: The defect edges were debeveled with a #15 scalpel blade.  The primary defect was closed partially with a complex linear closure.  Given the location of the remaining defect, shape of the defect and the proximity to free margins a transposition flap was deemed most appropriate for complete closure of the defect.  Using a sterile surgical marker, an appropriate advancement flap was drawn incorporating the defect and placing the expected incisions within the relaxed skin tension lines where possible.    The area thus outlined was incised deep to adipose tissue with a #15 scalpel blade.  The skin margins were undermined to an appropriate distance in all directions utilizing iris scissors.
Complex Repair And V-Y Plasty Text: The defect edges were debeveled with a #15 scalpel blade.  The primary defect was closed partially with a complex linear closure.  Given the location of the remaining defect, shape of the defect and the proximity to free margins a V-Y plasty was deemed most appropriate for complete closure of the defect.  Using a sterile surgical marker, an appropriate advancement flap was drawn incorporating the defect and placing the expected incisions within the relaxed skin tension lines where possible.    The area thus outlined was incised deep to adipose tissue with a #15 scalpel blade.  The skin margins were undermined to an appropriate distance in all directions utilizing iris scissors.
Complex Repair And M Plasty Text: The defect edges were debeveled with a #15 scalpel blade.  The primary defect was closed partially with a complex linear closure.  Given the location of the remaining defect, shape of the defect and the proximity to free margins an M plasty was deemed most appropriate for complete closure of the defect.  Using a sterile surgical marker, an appropriate advancement flap was drawn incorporating the defect and placing the expected incisions within the relaxed skin tension lines where possible.    The area thus outlined was incised deep to adipose tissue with a #15 scalpel blade.  The skin margins were undermined to an appropriate distance in all directions utilizing iris scissors.
Complex Repair And Double M Plasty Text: The defect edges were debeveled with a #15 scalpel blade.  The primary defect was closed partially with a complex linear closure.  Given the location of the remaining defect, shape of the defect and the proximity to free margins a double M plasty was deemed most appropriate for complete closure of the defect.  Using a sterile surgical marker, an appropriate advancement flap was drawn incorporating the defect and placing the expected incisions within the relaxed skin tension lines where possible.    The area thus outlined was incised deep to adipose tissue with a #15 scalpel blade.  The skin margins were undermined to an appropriate distance in all directions utilizing iris scissors.
Complex Repair And W Plasty Text: The defect edges were debeveled with a #15 scalpel blade.  The primary defect was closed partially with a complex linear closure.  Given the location of the remaining defect, shape of the defect and the proximity to free margins a W plasty was deemed most appropriate for complete closure of the defect.  Using a sterile surgical marker, an appropriate advancement flap was drawn incorporating the defect and placing the expected incisions within the relaxed skin tension lines where possible.    The area thus outlined was incised deep to adipose tissue with a #15 scalpel blade.  The skin margins were undermined to an appropriate distance in all directions utilizing iris scissors.
Complex Repair And Z Plasty Text: The defect edges were debeveled with a #15 scalpel blade.  The primary defect was closed partially with a complex linear closure.  Given the location of the remaining defect, shape of the defect and the proximity to free margins a Z plasty was deemed most appropriate for complete closure of the defect.  Using a sterile surgical marker, an appropriate advancement flap was drawn incorporating the defect and placing the expected incisions within the relaxed skin tension lines where possible.    The area thus outlined was incised deep to adipose tissue with a #15 scalpel blade.  The skin margins were undermined to an appropriate distance in all directions utilizing iris scissors.
Complex Repair And Dorsal Nasal Flap Text: The defect edges were debeveled with a #15 scalpel blade.  The primary defect was closed partially with a complex linear closure.  Given the location of the remaining defect, shape of the defect and the proximity to free margins a dorsal nasal flap was deemed most appropriate for complete closure of the defect.  Using a sterile surgical marker, an appropriate flap was drawn incorporating the defect and placing the expected incisions within the relaxed skin tension lines where possible.    The area thus outlined was incised deep to adipose tissue with a #15 scalpel blade.  The skin margins were undermined to an appropriate distance in all directions utilizing iris scissors.
Complex Repair And Ftsg Text: The defect edges were debeveled with a #15 scalpel blade.  The primary defect was closed partially with a complex linear closure.  Given the location of the defect, shape of the defect and the proximity to free margins a full thickness skin graft was deemed most appropriate to repair the remaining defect.  The graft was trimmed to fit the size of the remaining defect.  The graft was then placed in the primary defect, oriented appropriately, and sutured into place.
Complex Repair And Split-Thickness Skin Graft Text: The defect edges were debeveled with a #15 scalpel blade.  The primary defect was closed partially with a complex linear closure.  Given the location of the defect, shape of the defect and the proximity to free margins a split thickness skin graft was deemed most appropriate to repair the remaining defect.  The graft was trimmed to fit the size of the remaining defect.  The graft was then placed in the primary defect, oriented appropriately, and sutured into place.
Complex Repair And Epidermal Autograft Text: The defect edges were debeveled with a #15 scalpel blade.  The primary defect was closed partially with a complex linear closure.  Given the location of the defect, shape of the defect and the proximity to free margins an epidermal autograft was deemed most appropriate to repair the remaining defect.  The graft was trimmed to fit the size of the remaining defect.  The graft was then placed in the primary defect, oriented appropriately, and sutured into place.
Complex Repair And Dermal Autograft Text: The defect edges were debeveled with a #15 scalpel blade.  The primary defect was closed partially with a complex linear closure.  Given the location of the defect, shape of the defect and the proximity to free margins an dermal autograft was deemed most appropriate to repair the remaining defect.  The graft was trimmed to fit the size of the remaining defect.  The graft was then placed in the primary defect, oriented appropriately, and sutured into place.
Complex Repair And Tissue Cultured Epidermal Autograft Text: The defect edges were debeveled with a #15 scalpel blade.  The primary defect was closed partially with a complex linear closure.  Given the location of the defect, shape of the defect and the proximity to free margins an tissue cultured epidermal autograft was deemed most appropriate to repair the remaining defect.  The graft was trimmed to fit the size of the remaining defect.  The graft was then placed in the primary defect, oriented appropriately, and sutured into place.
Complex Repair And Xenograft Text: The defect edges were debeveled with a #15 scalpel blade.  The primary defect was closed partially with a complex linear closure.  Given the location of the defect, shape of the defect and the proximity to free margins a xenograft was deemed most appropriate to repair the remaining defect.  The graft was trimmed to fit the size of the remaining defect.  The graft was then placed in the primary defect, oriented appropriately, and sutured into place.
Complex Repair And Skin Substitute Graft Text: The defect edges were debeveled with a #15 scalpel blade.  The primary defect was closed partially with a complex linear closure.  Given the location of the remaining defect, shape of the defect and the proximity to free margins a skin substitute graft was deemed most appropriate to repair the remaining defect.  The graft was trimmed to fit the size of the remaining defect.  The graft was then placed in the primary defect, oriented appropriately, and sutured into place.
Consent was obtained from the patient. The risks and benefits to therapy were discussed in detail. Specifically, the risks of infection, scarring, bleeding, prolonged wound healing, incomplete removal, allergy to anesthesia, nerve injury and recurrence were addressed. Prior to the procedure, the treatment site was clearly identified and confirmed by the patient. All components of Universal Protocol/PAUSE Rule completed.
Post-Care Instructions: I reviewed with the patient in detail post-care instructions. Patient is not to engage in any heavy lifting, exercise, or swimming for the next 14 days. Should the patient develop any fevers, chills, bleeding, severe pain patient will contact the office immediately. Wash area once or twice daily with mild soap and water or half-strength hydrogen peroxide. Apply Vaseline or Polysporin ointment and a clean bandage.
Home Suture Removal Text: Patient was provided a home suture removal kit and will remove their sutures at home.  If they have any questions or difficulties they will call the office.
Where Do You Want The Question To Include Opioid Counseling Located?: Case Summary Tab
Billing Type: Third-Party Bill
Information: Selecting Yes will display possible errors in your note based on the variables you have selected. This validation is only offered as a suggestion for you. PLEASE NOTE THAT THE VALIDATION TEXT WILL BE REMOVED WHEN YOU FINALIZE YOUR NOTE. IF YOU WANT TO FAX A PRELIMINARY NOTE YOU WILL NEED TO TOGGLE THIS TO 'NO' IF YOU DO NOT WANT IT IN YOUR FAXED NOTE.

## 2020-03-18 NOTE — PROCEDURE: COUNSELING
Detail Level: Detailed
Patient Specific Counseling (Will Not Stick From Patient To Patient): Patient advised to take doxycycline only if excision site looks infected.  She will call the office if she does so.

## 2020-04-08 ENCOUNTER — APPOINTMENT (RX ONLY)
Dept: URBAN - NONMETROPOLITAN AREA CLINIC 1 | Facility: CLINIC | Age: 70
Setting detail: DERMATOLOGY
End: 2020-04-08

## 2020-04-08 VITALS — TEMPERATURE: 96.3 F

## 2020-04-08 DIAGNOSIS — Z48.02 ENCOUNTER FOR REMOVAL OF SUTURES: ICD-10-CM

## 2020-04-08 PROCEDURE — ? SUTURE REMOVAL (GLOBAL PERIOD)

## 2020-04-08 PROCEDURE — 99024 POSTOP FOLLOW-UP VISIT: CPT

## 2020-04-08 ASSESSMENT — LOCATION SIMPLE DESCRIPTION DERM: LOCATION SIMPLE: RIGHT PRETIBIAL REGION

## 2020-04-08 ASSESSMENT — LOCATION DETAILED DESCRIPTION DERM: LOCATION DETAILED: RIGHT MEDIAL DISTAL PRETIBIAL REGION

## 2020-04-08 ASSESSMENT — LOCATION ZONE DERM: LOCATION ZONE: LEG

## 2020-04-08 NOTE — PROCEDURE: SUTURE REMOVAL (GLOBAL PERIOD)
Detail Level: Detailed
Add 84828 Cpt? (Important Note: In 2017 The Use Of 69239 Is Being Tracked By Cms To Determine Future Global Period Reimbursement For Global Periods): yes

## 2021-03-19 ENCOUNTER — APPOINTMENT (RX ONLY)
Dept: URBAN - NONMETROPOLITAN AREA CLINIC 1 | Facility: CLINIC | Age: 71
Setting detail: DERMATOLOGY
End: 2021-03-19

## 2021-03-19 DIAGNOSIS — L81.4 OTHER MELANIN HYPERPIGMENTATION: ICD-10-CM

## 2021-03-19 DIAGNOSIS — L57.0 ACTINIC KERATOSIS: ICD-10-CM

## 2021-03-19 DIAGNOSIS — D22 MELANOCYTIC NEVI: ICD-10-CM

## 2021-03-19 PROBLEM — D22.5 MELANOCYTIC NEVI OF TRUNK: Status: ACTIVE | Noted: 2021-03-19

## 2021-03-19 PROBLEM — D22.71 MELANOCYTIC NEVI OF RIGHT LOWER LIMB, INCLUDING HIP: Status: ACTIVE | Noted: 2021-03-19

## 2021-03-19 PROCEDURE — ? LIQUID NITROGEN

## 2021-03-19 PROCEDURE — ? COUNSELING

## 2021-03-19 PROCEDURE — 17003 DESTRUCT PREMALG LES 2-14: CPT

## 2021-03-19 PROCEDURE — ? OBSERVATION

## 2021-03-19 PROCEDURE — 17000 DESTRUCT PREMALG LESION: CPT

## 2021-03-19 PROCEDURE — 99213 OFFICE O/P EST LOW 20 MIN: CPT | Mod: 25

## 2021-03-19 ASSESSMENT — LOCATION SIMPLE DESCRIPTION DERM
LOCATION SIMPLE: UPPER BACK
LOCATION SIMPLE: RIGHT CALF
LOCATION SIMPLE: ABDOMEN
LOCATION SIMPLE: RIGHT LIP
LOCATION SIMPLE: RIGHT 3RD TOE
LOCATION SIMPLE: CHEST

## 2021-03-19 ASSESSMENT — LOCATION DETAILED DESCRIPTION DERM
LOCATION DETAILED: RIGHT DISTAL MEDIAL CALF
LOCATION DETAILED: PERIUMBILICAL SKIN
LOCATION DETAILED: SUPERIOR THORACIC SPINE
LOCATION DETAILED: RIGHT UPPER CUTANEOUS LIP
LOCATION DETAILED: RIGHT MEDIAL SUPERIOR CHEST
LOCATION DETAILED: RIGHT DORSAL 3RD TOE

## 2021-03-19 ASSESSMENT — LOCATION ZONE DERM
LOCATION ZONE: TOE
LOCATION ZONE: LEG
LOCATION ZONE: LIP
LOCATION ZONE: TRUNK

## 2021-03-19 NOTE — PROCEDURE: OBSERVATION
Body Location Override (Optional - Billing Will Still Be Based On Selected Body Map Location If Applicable): Mid upper abdomen
Detail Level: Detailed
Size Of Lesion In Cm (Optional): 0.3
X Size Of Lesion In Cm (Optional): 0.2
Morphology Per Location (Optional): Even brown macule
Body Location Override (Optional - Billing Will Still Be Based On Selected Body Map Location If Applicable): R dorsal 3rd toe
Morphology Per Location (Optional): Pinkish brown papule
Size Of Lesion In Cm (Optional): 0.7
Morphology Per Location (Optional): even, brown macule with irregular border overlying scar

## 2021-03-19 NOTE — PROCEDURE: LIQUID NITROGEN
Consent: The patient's consent was obtained including but not limited to risks of crusting, scabbing, blistering, scarring, darker or lighter pigmentary change, recurrence, incomplete removal and infection.
Number Of Freeze-Thaw Cycles: 2 freeze-thaw cycles
Post-Care Instructions: I reviewed with the patient in detail post-care instructions. Patient is to wear sunprotection, and avoid picking at any of the treated lesions. Pt may apply Vaseline to crusted or scabbing areas.
Duration Of Freeze Thaw-Cycle (Seconds): 0
Render Note In Bullet Format When Appropriate: No
Detail Level: Simple

## 2023-02-08 ENCOUNTER — APPOINTMENT (RX ONLY)
Dept: URBAN - NONMETROPOLITAN AREA CLINIC 1 | Facility: CLINIC | Age: 73
Setting detail: DERMATOLOGY
End: 2023-02-08

## 2023-02-08 DIAGNOSIS — L81.4 OTHER MELANIN HYPERPIGMENTATION: ICD-10-CM

## 2023-02-08 DIAGNOSIS — D22 MELANOCYTIC NEVI: ICD-10-CM

## 2023-02-08 DIAGNOSIS — L82.0 INFLAMED SEBORRHEIC KERATOSIS: ICD-10-CM

## 2023-02-08 DIAGNOSIS — D18.0 HEMANGIOMA: ICD-10-CM

## 2023-02-08 DIAGNOSIS — Z12.83 ENCOUNTER FOR SCREENING FOR MALIGNANT NEOPLASM OF SKIN: ICD-10-CM

## 2023-02-08 DIAGNOSIS — Z87.2 PERSONAL HISTORY OF DISEASES OF THE SKIN AND SUBCUTANEOUS TISSUE: ICD-10-CM

## 2023-02-08 DIAGNOSIS — B07.8 OTHER VIRAL WARTS: ICD-10-CM

## 2023-02-08 PROBLEM — D22.5 MELANOCYTIC NEVI OF TRUNK: Status: ACTIVE | Noted: 2023-02-08

## 2023-02-08 PROBLEM — D22.71 MELANOCYTIC NEVI OF RIGHT LOWER LIMB, INCLUDING HIP: Status: ACTIVE | Noted: 2023-02-08

## 2023-02-08 PROBLEM — D18.01 HEMANGIOMA OF SKIN AND SUBCUTANEOUS TISSUE: Status: ACTIVE | Noted: 2023-02-08

## 2023-02-08 PROCEDURE — ? COUNSELING

## 2023-02-08 PROCEDURE — 17110 DESTRUCTION B9 LES UP TO 14: CPT

## 2023-02-08 PROCEDURE — ? LIQUID NITROGEN

## 2023-02-08 PROCEDURE — ? OBSERVATION

## 2023-02-08 PROCEDURE — 99213 OFFICE O/P EST LOW 20 MIN: CPT | Mod: 25

## 2023-02-08 ASSESSMENT — LOCATION ZONE DERM
LOCATION ZONE: TOE
LOCATION ZONE: NECK
LOCATION ZONE: TRUNK
LOCATION ZONE: FINGER
LOCATION ZONE: LEG

## 2023-02-08 ASSESSMENT — LOCATION SIMPLE DESCRIPTION DERM
LOCATION SIMPLE: LEFT UPPER BACK
LOCATION SIMPLE: RIGHT 3RD TOE
LOCATION SIMPLE: CHEST
LOCATION SIMPLE: NECK
LOCATION SIMPLE: RIGHT MIDDLE FINGER
LOCATION SIMPLE: RIGHT UPPER BACK
LOCATION SIMPLE: RIGHT CALF
LOCATION SIMPLE: ABDOMEN

## 2023-02-08 ASSESSMENT — LOCATION DETAILED DESCRIPTION DERM
LOCATION DETAILED: RIGHT DORSAL 3RD TOE
LOCATION DETAILED: PERIUMBILICAL SKIN
LOCATION DETAILED: RIGHT SUPERIOR LATERAL NECK
LOCATION DETAILED: RIGHT DISTAL MEDIAL CALF
LOCATION DETAILED: MIDDLE STERNUM
LOCATION DETAILED: RIGHT PROXIMAL PALMAR MIDDLE FINGER
LOCATION DETAILED: LEFT MEDIAL UPPER BACK
LOCATION DETAILED: RIGHT SUPERIOR MEDIAL UPPER BACK

## 2023-02-08 NOTE — PROCEDURE: LIQUID NITROGEN
Post-Care Instructions: I reviewed with the patient in detail post-care instructions. Patient is to wear sunprotection, and avoid picking at any of the treated lesions. Pt may apply Vaseline to crusted or scabbing areas.
Render Post-Care Instructions In Note?: yes
Number Of Freeze-Thaw Cycles: 2 freeze-thaw cycles
Include Z78.9 (Other Specified Conditions Influencing Health Status) As An Associated Diagnosis?: No
Consent: The patient's consent was obtained including but not limited to risks of crusting, scabbing, blistering, scarring, darker or lighter pigmentary change, recurrence, incomplete removal and infection.
Detail Level: Simple
Spray Paint Text: The liquid nitrogen was applied to the skin utilizing a spray paint frosting technique.
Medical Necessity Clause: This procedure was medically necessary because the lesions that were treated were:
Medical Necessity Information: It is in your best interest to select a reason for this procedure from the list below. All of these items fulfill various CMS LCD requirements except the new and changing color options.

## 2024-06-27 ENCOUNTER — APPOINTMENT (RX ONLY)
Dept: URBAN - NONMETROPOLITAN AREA CLINIC 1 | Facility: CLINIC | Age: 74
Setting detail: DERMATOLOGY
End: 2024-06-27

## 2024-06-27 DIAGNOSIS — D18.0 HEMANGIOMA: ICD-10-CM

## 2024-06-27 DIAGNOSIS — Z12.83 ENCOUNTER FOR SCREENING FOR MALIGNANT NEOPLASM OF SKIN: ICD-10-CM

## 2024-06-27 DIAGNOSIS — D22 MELANOCYTIC NEVI: ICD-10-CM

## 2024-06-27 DIAGNOSIS — Z87.2 PERSONAL HISTORY OF DISEASES OF THE SKIN AND SUBCUTANEOUS TISSUE: ICD-10-CM

## 2024-06-27 DIAGNOSIS — L91.8 OTHER HYPERTROPHIC DISORDERS OF THE SKIN: ICD-10-CM

## 2024-06-27 DIAGNOSIS — L81.4 OTHER MELANIN HYPERPIGMENTATION: ICD-10-CM | Status: STABLE

## 2024-06-27 DIAGNOSIS — L82.1 OTHER SEBORRHEIC KERATOSIS: ICD-10-CM

## 2024-06-27 PROBLEM — D22.5 MELANOCYTIC NEVI OF TRUNK: Status: ACTIVE | Noted: 2024-06-27

## 2024-06-27 PROBLEM — D18.01 HEMANGIOMA OF SKIN AND SUBCUTANEOUS TISSUE: Status: ACTIVE | Noted: 2024-06-27

## 2024-06-27 PROBLEM — D22.71 MELANOCYTIC NEVI OF RIGHT LOWER LIMB, INCLUDING HIP: Status: ACTIVE | Noted: 2024-06-27

## 2024-06-27 PROCEDURE — ? SKIN TAG REMOVAL (COSMETIC)

## 2024-06-27 PROCEDURE — ? COUNSELING

## 2024-06-27 PROCEDURE — 99213 OFFICE O/P EST LOW 20 MIN: CPT

## 2024-06-27 PROCEDURE — ? OBSERVATION

## 2024-06-27 ASSESSMENT — LOCATION DETAILED DESCRIPTION DERM
LOCATION DETAILED: MIDDLE STERNUM
LOCATION DETAILED: RIGHT SUPERIOR MEDIAL MIDBACK
LOCATION DETAILED: PERIUMBILICAL SKIN
LOCATION DETAILED: RIGHT SUPERIOR MEDIAL UPPER BACK
LOCATION DETAILED: RIGHT DISTAL MEDIAL CALF
LOCATION DETAILED: RIGHT INFERIOR UPPER BACK
LOCATION DETAILED: LEFT MEDIAL UPPER BACK
LOCATION DETAILED: RIGHT DORSAL 3RD TOE
LOCATION DETAILED: LEFT INFERIOR ANTERIOR NECK

## 2024-06-27 ASSESSMENT — LOCATION ZONE DERM
LOCATION ZONE: TOE
LOCATION ZONE: TRUNK
LOCATION ZONE: LEG
LOCATION ZONE: NECK

## 2024-06-27 ASSESSMENT — LOCATION SIMPLE DESCRIPTION DERM
LOCATION SIMPLE: LEFT ANTERIOR NECK
LOCATION SIMPLE: RIGHT CALF
LOCATION SIMPLE: RIGHT 3RD TOE
LOCATION SIMPLE: RIGHT LOWER BACK
LOCATION SIMPLE: RIGHT UPPER BACK
LOCATION SIMPLE: LEFT UPPER BACK
LOCATION SIMPLE: CHEST
LOCATION SIMPLE: ABDOMEN

## 2024-12-14 ASSESSMENT — RHEUMATOLOGY NEW PATIENT QUESTIONNAIRE
BLURRY VISION: Y
FATIGUE: Y
RINGING IN EARS: Y
COLD-INDUCED COLOR CHANGES (WHITE, PURPLE, RED ON REWARMING): FINGERS
INSOMNIA: Y
HEARING LOSS: Y

## 2024-12-17 ENCOUNTER — HOSPITAL ENCOUNTER (OUTPATIENT)
Dept: RADIOLOGY | Facility: MEDICAL CENTER | Age: 74
End: 2024-12-17
Attending: STUDENT IN AN ORGANIZED HEALTH CARE EDUCATION/TRAINING PROGRAM
Payer: MEDICARE

## 2024-12-17 ENCOUNTER — OFFICE VISIT (OUTPATIENT)
Dept: RHEUMATOLOGY | Facility: MEDICAL CENTER | Age: 74
End: 2024-12-17
Attending: STUDENT IN AN ORGANIZED HEALTH CARE EDUCATION/TRAINING PROGRAM
Payer: MEDICARE

## 2024-12-17 ENCOUNTER — HOSPITAL ENCOUNTER (OUTPATIENT)
Dept: LAB | Facility: MEDICAL CENTER | Age: 74
End: 2024-12-17
Attending: STUDENT IN AN ORGANIZED HEALTH CARE EDUCATION/TRAINING PROGRAM
Payer: MEDICARE

## 2024-12-17 VITALS
DIASTOLIC BLOOD PRESSURE: 64 MMHG | HEART RATE: 71 BPM | HEIGHT: 65 IN | TEMPERATURE: 98.3 F | OXYGEN SATURATION: 97 % | WEIGHT: 136.25 LBS | SYSTOLIC BLOOD PRESSURE: 118 MMHG | BODY MASS INDEX: 22.7 KG/M2

## 2024-12-17 DIAGNOSIS — H53.8 BLURRY VISION, RIGHT EYE: ICD-10-CM

## 2024-12-17 DIAGNOSIS — M25.50 ARTHRALGIA OF MULTIPLE JOINTS: ICD-10-CM

## 2024-12-17 DIAGNOSIS — D84.9 IMMUNOSUPPRESSED STATUS (HCC): ICD-10-CM

## 2024-12-17 DIAGNOSIS — I73.00 RAYNAUD'S SYNDROME WITHOUT GANGRENE: ICD-10-CM

## 2024-12-17 DIAGNOSIS — H53.8 BLURRY VISION, RIGHT EYE: Primary | ICD-10-CM

## 2024-12-17 DIAGNOSIS — Z79.52 LONG TERM CURRENT USE OF SYSTEMIC STEROIDS: ICD-10-CM

## 2024-12-17 PROCEDURE — 3074F SYST BP LT 130 MM HG: CPT | Performed by: STUDENT IN AN ORGANIZED HEALTH CARE EDUCATION/TRAINING PROGRAM

## 2024-12-17 PROCEDURE — 86431 RHEUMATOID FACTOR QUANT: CPT

## 2024-12-17 PROCEDURE — 99204 OFFICE O/P NEW MOD 45 MIN: CPT | Performed by: STUDENT IN AN ORGANIZED HEALTH CARE EDUCATION/TRAINING PROGRAM

## 2024-12-17 PROCEDURE — 77077 JOINT SURVEY SINGLE VIEW: CPT

## 2024-12-17 PROCEDURE — 86039 ANTINUCLEAR ANTIBODIES (ANA): CPT

## 2024-12-17 PROCEDURE — 36415 COLL VENOUS BLD VENIPUNCTURE: CPT

## 2024-12-17 PROCEDURE — 86200 CCP ANTIBODY: CPT

## 2024-12-17 PROCEDURE — 83516 IMMUNOASSAY NONANTIBODY: CPT

## 2024-12-17 PROCEDURE — 3078F DIAST BP <80 MM HG: CPT | Performed by: STUDENT IN AN ORGANIZED HEALTH CARE EDUCATION/TRAINING PROGRAM

## 2024-12-17 PROCEDURE — 99212 OFFICE O/P EST SF 10 MIN: CPT | Performed by: STUDENT IN AN ORGANIZED HEALTH CARE EDUCATION/TRAINING PROGRAM

## 2024-12-17 RX ORDER — FOLIC ACID 1 MG/1
1 TABLET ORAL DAILY
Qty: 90 TABLET | Refills: 3 | Status: SHIPPED | OUTPATIENT
Start: 2024-12-17

## 2024-12-17 RX ORDER — PREDNISONE 20 MG/1
40 TABLET ORAL DAILY
COMMUNITY
Start: 2024-12-04 | End: 2024-12-17

## 2024-12-17 RX ORDER — METHOTREXATE 2.5 MG/1
15 TABLET ORAL
Qty: 78 TABLET | Refills: 3 | Status: SHIPPED | OUTPATIENT
Start: 2024-12-17

## 2024-12-17 RX ORDER — ESTRADIOL 0.1 MG/G
CREAM VAGINAL
COMMUNITY
Start: 2024-07-23

## 2024-12-17 RX ORDER — PREDNISONE 5 MG/1
TABLET ORAL
Qty: 200 TABLET | Refills: 0 | Status: SHIPPED | OUTPATIENT
Start: 2024-12-17

## 2024-12-17 ASSESSMENT — PATIENT HEALTH QUESTIONNAIRE - PHQ9: CLINICAL INTERPRETATION OF PHQ2 SCORE: 0

## 2024-12-17 NOTE — PROGRESS NOTES
[facility] RHEUMATOLOGY  [address], [address], [place] [address]  Phone: [phone] ? Fax: [phone]  [facility]/Health-Services/Rheumatology    NEW PATIENT VISIT NOTE      DATE OF SERVICE: 12/17/2024         Subjective     REFERRING PRACTITIONER:  Roosevelt Mabry M.D.  [address]  [address] [address]    PATIENT IDENTIFICATION:  Jovana Black  [id number] [address]    YOB: 1950    MEDICAL RECORD NUMBER: [id number]         CHIEF COMPLAINT:   Chief Complaint   Patient presents with    New Patient     Giant cell arteritis (HCC)       HISTORY OF PRESENT ILLNESS:  Jovana Black is a 74 y.o. female with pertinent history notable for macular degeneration, who referred by ophthalmology for rheumatologic evaluation of GCA in the setting of blurry vision and elevated inflammatory markers.     Patient developed sudden onset of right eye blurry vision (could not see well in the center) in early 11/2024.  No associated vision loss.  Prior to onset of ocular symptoms, had stiffness in the right jaw that lasted for at least 1 week (thought it might be stress from the election) but did not have pain with chewing or tongue pain.  She has not had any recurrent blurry vision or jaw stiffness.  She did not have bitemporal headaches, scalp tenderness, arm claudication, fevers, unintentional weight loss, chills, neck pain, bilateral shoulder or hip pain, or any other new joint pains.  She was evaluated by her ophthalmologist who suspected possible GCA given elevated inflammatory markers (ESR 46, CRP 26.6) on 11/7/2024 so was started on prednisone 60 mg taper (60 mg x 5 days then 40 mg, 20 mg, followed by 10 mg).  R temporal artery biopsy on 11/7 did not show evidence of giant cell arteritis but focal calcifications.  Repeat inflammatory markers on 11/11/2024 were normal however, on 12/4/24, she had second elevation in inflammatory markers after being off  prednisone for 5 days so was restarted on prednisone but at 40 mg daily which she is currently on at this time.  Reports chronic pain along bilateral fifth digits on the sides but no swelling.  She also has a history of trigger finger.  Reports intermittent morning stiffness in the hands (sometimes cannot make fists) that last perhaps 15-20 minutes.  Reports new onset cold induced paleness of the fingertips (L index finger) that she noticed in 2024.  Symptoms preceded numbness but she has not had any other color changes.  Denies rashes, sicca symptoms, episodes of red painful photophobic eyes, mucosal ulcerations, pleuritic chest pains, dyspnea on exertion, psoriasis, history of stroke, DVT/PE, or diverticulitis.  She rarely drinks alcohol.  She will be traveling to [place] for a hiking trip the last week of 2024 and will be there for 3 weeks.    Reports other aspects of symptoms and medical history as noted on the questionnaire below or scanned under media tab.    Pertinent treatments:   Prednisone taper  Pertinent positive labs: 2024, ESR (48, normal in 2024), CRP (32.8 mg/L, normal in 2024 )  Pertinent negative labs: N/A  Pertinent imagin/2024 intracranial US: Unremarkable bilateral carotid Doppler ultrasound. No hemodynamically significant stenosis.   2024 R temporal artery biopsy: No evidence of GCA    [facility] New Patient History Form    2024 10:56 AM PST - Filed by Patient   Demographic Information   Marital Status:    Occupation: Retired   Highest Level of Education: 2 years college   MAIN REASON FOR VISIT Bloodwork shows i have inflamation somewhere in body. Dr Carreon thought i might have giant cell arteritis and i had a temporal biopsy that showed no inflamation. Need to see if rheumatoid arthritis might be a cause.   HISTORY OF PRESENT ILLNESS   Date of symptom onset: 11/3/2024   Preceding incident/ailment:    Describe/list your symptoms: One evening of  blurred vision in right eye. No other systems after this one incident.   Exacerbating factors:    Alleviating factors:    Helpful medications: I was given prednisone until biopsy came back negative. Bloodwork went back to normal. After being off prednisone for  6 days bloodwork went out of range.   Ineffective medications:    Severity of pain (scale of 1-10):    Personal/emotional stressors:    Jj All The Areas Of Pain    REVIEW OF SYMPTOMS    General   Fevers    Chills    Night sweats    Malaise    Fatigue Yes   Unintentional weight loss    Musculoskeletal   Joint pain    Morning stiffness duration    Morning stiffness characteristic    Joint swelling    Joint instability    Tendon pain    Muscle pain    Body aches    Dermatologic   Hair loss with bald spots    Hair shedding    Skin thickening    Skin plaques    Sunlight-induced skin rash    Cold-induced color changes (white, purple, red on rewarming) Fingers   Neurologic/Psychiatric   Weakness    Spasms    Tingling    Burning    Numbness    Insomnia Yes   Anxiety    Depression    Head/Eyes   Headaches    Temple pain    Dizziness    Dry eyes    Eye pain    Eye redness    Blurry vision Yes   Vision loss    Ears/Nose   Ear pain    Ringing in ears Yes   Vertigo    Hearing loss Yes   Nasal ulcers    Nosebleeds    Sinus pain    Nasal congestion    Snoring    Mouth/Throat   Oral ulcers    Bleeding gums    Dry mouth    Cavities    Sore throat    Sticking in throat    Difficulty speaking    Neck/Lymphatics   Thyroid pain    Thyroid swelling    Lymph node swelling    Cardiac/Respiratory   Chest pain with breathing    Dry cough    Cough with bloody phlegm    Shortness of breath    Fast heartbeats    Irregular heartbeats    Gastrointestinal   Nausea    Vomiting    Difficulty swallowing    Heartburn    Abdominal pain    Bloody stool    Mucus stool    Genitourinary   Pelvic pain    Genital ulcers    Abnormal discharge    Burning urination    Frothy urine    Blood in urine     MEDICAL/PERSONAL HISTORY DETAILS   Current Medical Problems: Prolapse bladder   Past/Resolved Medical Problems:    Surgeries/Procedures (include month/year): Tonsillectomy 6/1967, Right Thyroidectomy 5/2004, Right hand twigger finger repair 3/2009,  Left hand twigger finger repair 1/2020, Colonoscopy 9/2024, Temporal Art 11/2024   Prescription/Over-The-Counter/Herbal Medications:    Medication/Food/Material Allergies (include reactions):    Immunizations (include month/year) COVID 9/2024, Influenza 9/2024, RSV 10/2023, Shingrix 2/2019, 10/2019, Pneumonia 11/2017,  Tdap 8/2015, Hepatitis A & B 1/2011   Alcohol/Tobacco/Recreational Drugs:    Family Medical History (father, mother, siblings only):          REVIEW OF SYSTEMS:  Except as noted in the history above, relevant review of systems with emphasis on autoimmune rheumatic diseases was otherwise negative.      ACTIVE PROBLEM LIST:  Patient Active Problem List    Diagnosis Date Noted    Trigger finger 01/23/2020       PAST MEDICAL HISTORY:  Trigger finger  Macular degeneration    PAST SURGICAL HISTORY:  Past Surgical History:   Procedure Laterality Date    PB INCISE FINGER TENDON SHEATH Left 1/23/2020    Procedure: RELEASE, TRIGGER FINGER-MIDDLE;  Surgeon: Daniel Gonzalez M.D.;  Location: SURGERY [facility];  Service: Orthopedics    HAND SURGERY Right 2009    ring finger    THYROID LOBECTOMY Right 2004    TONSILLECTOMY Bilateral        MEDICATIONS:  Current Outpatient Medications   Medication Sig    Artificial Tear Solution (GENTEAL TEARS OP)     estradiol (ESTRACE VAGINAL) 0.1 MG/GM vaginal cream     Multiple Vitamins-Minerals (PRESERVISION/LUTEIN PO)     Psyllium 0.36 g Cap Take 0.36 g by mouth every day.    folic acid (FOLVITE) 1 MG Tab Take 1 Tablet by mouth every day. Except the day of methotrexate.    methotrexate 2.5 MG tablet Take 6 Tablets by mouth every 7 days.    predniSONE (DELTASONE) 5 MG Tab Take 7 tablets for 7 days, then 6 tablets for 7 days  "and decreased by 5 mg every week until you complete 5 mg for 7 days which will be followed by 2.5 mg for 7 days.       ALLERGIES:   No Known Allergies    IMMUNIZATIONS:   Immunization History   Administered Date(s) Administered    PFIZER BIVALENT SARS-COV-2 VACCINE (12+) 2022    PFIZER PURPLE CAP SARS-COV-2 VACCINATION (12+) 2021, 03/10/2021, 2021, 2022       SOCIAL HISTORY:   Social History     Socioeconomic History    Marital status:    Tobacco Use    Smoking status: Former     Current packs/day: 0.00     Average packs/day: 0.5 packs/day for 10.0 years (5.0 ttl pk-yrs)     Types: Cigarettes     Start date: 1967     Quit date: 1977     Years since quittin.9    Smokeless tobacco: Never   Vaping Use    Vaping status: Never Used   Substance and Sexual Activity    Alcohol use: Yes     Comment: reports 1/week    Drug use: Not Currently     Social Drivers of Health     Intimate Partner Violence: Not At Risk (2024)    Received from [facility] [place], and [place] prior to 2021)    Feeling Safe      Are you in a relationship with someone who hurts you emotionally and/or physically?: No       FAMILY HISTORY:  No family history on file.         Objective     Vital Signs: /64 (BP Location: Left arm, Patient Position: Sitting, BP Cuff Size: Adult)   Pulse 71   Temp 36.8 °C (98.3 °F) (Temporal)   Ht 1.638 m (5' 4.5\")   Wt 61.8 kg (136 lb 4 oz)   SpO2 97% Body mass index is 23.03 kg/m².    General: Appears well and comfortable  Eyes: No scleral or conjunctival lesions  ENT: No apparent oral or nasal lesions  Head/Neck: No temporal or scalp tenderness  Cardiovascular: Regular rate and rhythm; no pericardial rubs.  Equal distal UE pulses.  No carotid, supraclavicular, subclavian or abdominal bruits  Respiratory: Breathing quiet and unlabored; no rales or pleural rubs  Gastrointestinal: No apparent organomegaly or abdominal " masses  Integumentary: No significant cutaneous lesions or dyspigmentation  Musculoskeletal: No significant joint tenderness, periarticular soft tissue swelling, warmth, erythema, or overt synovitis; no significant restriction in range of motion of joints examined  Neurologic: No focal sensory or motor deficits  Psychiatric: Mood and affect appropriate      LABORATORY RESULTS REVIEWED AND INTERPRETED BY ME:  Lab Results   Component Value Date/Time    WBC 4.5 08/30/2024 07:17 AM    RBC 4.26 08/30/2024 07:17 AM    HEMOGLOBIN 13.3 08/30/2024 07:17 AM    HEMATOCRIT 38.5 08/30/2024 07:17 AM    MCV 90.4 08/30/2024 07:17 AM    MCH 31.2 08/30/2024 07:17 AM    MCHC 34.5 08/30/2024 07:17 AM    RDW 12.8 08/30/2024 07:17 AM    PLATELETCT 293 08/30/2024 07:17 AM    MPV 9.8 08/30/2024 07:17 AM    NEUTS 2.95 08/30/2024 07:17 AM    LYMPHOCYTES 26 08/30/2024 07:17 AM    MONOCYTES 6 08/30/2024 07:17 AM    EOSINOPHILS 1 08/30/2024 07:17 AM    BASOPHILS 1 08/30/2024 07:17 AM       RADIOLOGY RESULTS REVIEWED AND INTERPRETED BY ME: See above      All relevant laboratory and imaging results reported on this note were reviewed and interpreted by me.         Assessment & Plan     Jovana Black is a 74 y.o. female with history as noted above whose presentation merits the following clinical impressions and recommendations:    1. Blurry vision, right eye  Patient had one episode of acute right blurry vision without vision loss, temporal headaches, scalp tenderness, jaw/tongue/arm claudication, or constitutional symptoms.  Given elevated inflammatory markers, she was started on prednisone 60 mg by ophthalmology due to concerns for GCA.  Right temporal artery biopsy one day after she started prednisone was normal.  While on steroids had normalized inflammatory markers however, ESR/CRP went back up 5 days post steroid taper.  She has not had recurrent blurry vision.  No concerns for PMR.  Although she has been on steroids for ~ 5 weeks,  still reasonable to obtain temporal artery ultrasound.  Given the possibility of skip lesions on biopsy, reasonable to treat for presumed GCA to err on the side of caution.  Opted for methotrexate which has been used as a steroid sparing agent in GCA over tocilizumab as she will be traveling to [place] for 3 weeks in 1/2024.  Reasonable to obtain additional studies below for confirmatory, exclusionary, and risk stratification purposes.  - Complete prednisone 40 mg daily for the remainder of this week and start new taper as below  - predniSONE (DELTASONE) 5 MG Tab; Take 7 tablets for 7 days, then 6 tablets for 7 days and decreased by 5 mg every week until you complete 5 mg for 7 days which will be followed by 2.5 mg for 7 days.  Dispense: 200 Tablet; Refill: 0  - folic acid (FOLVITE) 1 MG Tab; Take 1 Tablet by mouth every day. Except the day of methotrexate.  Dispense: 90 Tablet; Refill: 3  - methotrexate 2.5 MG tablet; Take 6 Tablets by mouth every 7 days.  Dispense: 78 Tablet; Refill: 3  - US-TEMPORAL ARTERY DUPLEX BILAT; Future  - CRP QUANTITIVE (NON-CARDIAC); Future  - Sed Rate; Future    2. Immunosuppressed status (HCC)  Counseled on the potential adverse effects of methotrexate use, the need to avoid regular alcohol intake, and the need for routine monitoring labs every 3-4 months.  - CBC WITH DIFFERENTIAL; Future  - Comp Metabolic Panel; Future  - Avoid vaccines until prednisone is <20 mg  - For vaccines, skip methotrexate 1 week before and resume 1 week after    3. Long term current use of systemic steroids  Counseled on the potential adverse effects of long-term steroid use, including adrenal insufficiency, decrease in bone density, skin thinning with easy bruising, and metabolic syndrome including hyperglycemia and hyperlipidemia.  - Maintenance vitamin D and calcium supplementation (2,000 IU D3 daily and calcium 1,200-1,500 mg/day including dietary sources) for bone health.    4. Raynaud's syndrome  without gangrene  No trophic complications such as digital ulcerations, pitting, fissuring, gangrene and /or infarcts). Symptoms are mild and only present during colder months.   - Avoid exposure to cold temperature extremes and wear gloves/socks to keep hands/feet warm  - Consider trial of topical nitroglycerin or oral CCB depending on her clinical trajectory    5. Arthralgia of multiple joints  Presentation is compatible with biomechanical arthralgia secondary to osteoarthritis rather than inflammatory arthritis however, will obtain studies below for complete assessment.  - ANTINUCLEAR AB (SHANNA), HEP-2, IGG W/RFLX; Future  - DX-JOINT SURVEY-HANDS SINGLE VIEW; Future  - RHEUMATOID ARTHRITIS PANEL; Future  - Consider trial of Voltaren 1% gel 3-4 times daily as needed, topical lidocaine or capsaicin  - Extra strength Tylenol as needed  - Avoid oral NSAIDs while on steroids    The above assessment and plan were discussed with the patient who acknowledged understanding of the plan.    FOLLOW-UP: Return in about 6 weeks (around 1/28/2025).         Thank you for the opportunity to participate in the care of Jovana Black.    Heidi Diego D.O.  Rheumatologist

## 2024-12-17 NOTE — PATIENT INSTRUCTIONS
Thank you for visiting our clinic today.     Summary of your visit:    - Complete prednisone 40 mg daily for the remainder of this week and start new taper as below      Take 7 Tablets by mouth every day for 7 days, THEN 6 Tablets every day for 7 days, THEN 5 Tablets every day for 7 days, THEN 4 Tablets every day for 7 days, THEN 3 Tablets every day for 7 days, THEN 2 Tablets every day for 7 days, THEN 1 Tablet every day for 7 days, THEN 0.5 Tablets every day for 7 days.     - Avoid vaccines until prednisone is < 20 mg    - Avoid NSAIDs (ibuprofen, naproxen, meloxicam, celecoxib, Aleve, Advil etc) while on steroids to reduce risk of gastrointestinal ulcers or bleeds    - Okay to take extra strength Tylenol as needed    - Maintenance vitamin D and calcium supplementation (2,000 IU D3 daily and calcium 1,200-1,500 mg/day including dietary sources) for bone health.    - For vaccines, skip methotrexate 1 week before and resume 1 week after    Follow up in 6 weeks.     Methotrexate is one of the most effective and commonly used medications in the treatment of rheumatoid arthritis and other forms of inflammatory arthritis, and also may be used to treat lupus, inflammatory myositis, vasculitis, and some forms of childhood arthritis. It is known as a disease-modifying anti-rheumatic drug (DMARD) because it not only decreases the pain and swelling of arthritis, but it also can decrease damage to joints and long-term disability.    How to Take It  Methotrexate comes either as pills or as a subcutaneous injection. Methotrexate is usually taken as a single dose once per week, although occasionally the dose is split into two to improve absorption or avoid side effects. Your doctor also may prescribe a folic acid (or folate) supplement to decrease the chance of side effects. Methotrexate should not be taken if kidney or liver function is abnormal. Alcohol should be avoided as it can significantly increase the risk for liver  damage while taking methotrexate. Contraceptive use is highly encouraged while on Methotrexate as this medication can cause fetal abnormalities. Regular laboratory monitoring is required while taking methotrexate. Symptomatic improvement can be noted within three to six weeks of initiating therapy, but full benefit of this drug may take 12 weeks.    Side Effects  Methotrexate can lower the ability of your immune system to fight infections. If you develop an infection while using this medication, you should stop it and contact your doctor. The most common side effects are gastrointestinal upset and elevations of liver function tests.    About 1-3% of patients develop mouth sores, rash, diarrhea, and abnormalities in blood counts, so it is important to have regular blood tests performed.    Methotrexate rarely can cause liver issues, most likely to occur in patients who already have liver problems or are using alcohol or taking other drugs that are toxic to the liver. Slow hair loss is seen in some patients, but reverses once medication is stopped. This can often be managed by taking folic acid. It is important to remember that most patients do not experience side effects.    Tell Your Rheumatology Provider  You should contact your rheumatology provider if you develop symptoms of an infection, such as a fever or cough, or if you think you are having any side effects. Be sure to let your rheumatology provider know if you are pregnant, planning to get pregnant, or if you are breastfeeding. Methotrexate treatment should be discontinued for at least three months before attempting to become pregnant. Men taking methotrexate should talk to their physician prior to attempts to conceive. If you are planning on having surgery or will be receiving chemotherapy or radiation therapy, talk to your rheumatology provider first.     For Raynaud's  - Avoid exposure to cold temperature extremes and wear gloves/socks to keep hands/feet  warm  - Consider a trial of topical or oral medication depending on severity  - More info provided below    Raynaud’s Phenomenon (RP) results when there is a decrease in blood flow to the fingers and toes when someone is exposed to cold weather or stress, due to an exaggerated constriction in blood vessels. RP can also affect your ears, nose, and even nipples. The fingers or toes typically change color, and this can cause numbness, tingling, and pain. These symptoms occur intermittently and tend to resolve spontaneously on rewarming. RP can either be “primary” or “secondary”. Primary Raynaud’s is most common and typically affects women typically under the age of 30 and is not associated with any other condition. Secondary Raynaud’s is “secondary” to another condition such as lupus, scleroderma, rheumatoid arthritis, Sjogren’s syndrome, and thyroid disease. Rheumatologists help to differentiate whether Raynaud’s is primary or secondary in nature.    What Are the Signs/Symptoms?  One or multiple fingers/toes can initially turn white due to decreased blood flow, and subsequently blue due to lack of oxygenation of the tissues. Upon rewarming, there is a sudden rush of blood to the affected areas causing the fingers/toes to become red in color. This is known as a triphasic color change which is classic for Raynaud’s. Symptoms can last for seconds to many minutes. If symptoms last for hours or are persistent despite attempts at rewarming, this can suggest more serious disease. Patients with severe Raynaud’s can develop ulcerations on their fingertips or toes due to tissue death resulting from lack of blood flow.    What Are Common Treatments?  Raynaud’s can be managed with both lifestyle modifications and medications. Patients can use mittens/gloves, thick socks, and insertable warmers to help keep their hands and feet warm. Stress reduction and smoking cessation are also recommended to help decrease Raynaud’s attacks.  Medications such as calcium channel blockers (amlodipine, nifedipine, felodipine, and others) and angiotensin-receptor blockers increase blood flow to the fingers and toes. For patients with more severe symptoms or who have developed complications such as ulcers, other medications can be used including sildenafil or prostacyclins.    Living with Raynaud's Phenomenon  Wear warm, protective clothing like socks, boots, mittens or gloves in the fall and winter. In the summer months, avoid keeping the temperature of the AC too low. Wear oven mitts or gloves when taking food out of the freezer or refrigerator. Exercise regularly as this can boost healthy circulation and try to decrease stressful triggers. If symptoms occur, rewarm your body, place hands under the armpits, wiggle fingers and toes, run fingers or toes under warm (not hot) water, or massage the hands and feet.     [facility] AFTER VISIT GUIDE  Below are important guidelines to help you navigate your health care needs and assist us in caring for you safely and  effectively. We encourage you to carefully read and understand this information and adhere to them accordingly.    [facility]ote Messaging and Phone Calls:   Diagnosis and Treatment - For a detailed explanation of your condition and treatment plan from today’s visit, refer  to the visit note on Wote via the following steps:  o Log in to Wote and click on “Visits” at the top.  o Scroll down to “Past Visits” under Appointments.  o Click on “View Notes” under the appropriate visit date.   Questions or Concerns - [employer] messaging is for non-urgent matters that do not require immediate attention and  should be brief with no more than two questions or concerns. If you have multiple questions or concerns, we ask that  you schedule an appointment to have them properly addressed.   Response to Messages - Wote messages are addressed throughout the week depending on clinical availability,  so we ask  that you allow up to one week for a response.   Phone Calls and Voicemails - Phone calls and voicemail messages are reserved for time-sensitive matters that  cannot wait to be addressed via [email or web address]. We ask that you refrain from calling the office multiple times or leaving  multiple voicemails regarding the same issue as doing so may lead to delays in response time.   Urgent Issues - For urgent medical matters or medical emergencies that cannot wait, you are advised to go to your  nearest Urgent Care or Emergency Department for immediate attention.    Laboratory Tests and Imaging Studies:   Future Lab and Imaging Orders - We ask that you get your lab tests and imaging studies done no later than one  week before your follow-up visit unless instructed otherwise.   Results Communication - You may see some test results marked as “abnormal” that are not necessarily significant  or concerning. If there are significant abnormalities on your test results that warrant further action, you will be notified  via [employer] or phone call, otherwise they will be addressed at your follow-up visit.    Prescriptions and Refill Requests:   General Prescriptions (e.g. prednisone, hydroxychloroquine, leflunomide, methotrexate, etc.) - These are sent  to Retail Pharmacies, so all refill requests of these medications should be directed to your local pharmacy.   Specialty Prescriptions (e.g. Enbrel, Humira, Cosentyx, Xeljanz, etc.) - These are sent to Specialty Pharmacies,  so all refill requests of these medications should be directed to your designated specialty pharmacy.   Infusion Prescriptions (e.g. Remicade, Simponi Aria, Rituxan, Saphnelo, etc.) - These are sent to Outpatient  Infusion Centers, so all scheduling requests of these medications should be directed to your local infusion center.    Medication Risks and Adverse Effects:   Immunosuppressed Status - Steroids and antirheumatic drugs are immunosuppressants, so they increase the  risk  of infections and can have side effects on various organ systems in your body, though most of them are uncommon.   Potential Side Effects - Be sure to read the drug package inserts to learn about the potential side effects of your  medications before you start taking them and take them exactly as prescribed unless instructed otherwise.   In Case of Side Effects - If you experience any significant side effects, stop taking the medication immediately and  promptly notify the prescriber. Depending on the severity of the side effects, consider going to an Urgent Care or  Emergency Department for immediate attention.    Immunizations and Health Screening:   Vaccinations - If you are on immunosuppressive therapy, it is important that you are up to date on age-appropriate  immunizations, particularly shingles and pneumonia vaccines, which you can request from your primary care provider  or from us at your next appointment.   Screening Tests - It is also important that you are up to date on age-appropriate screening tests, such as pap  smear, mammography, and colonoscopy, which you can request from your primary care provider.    Educational and Supportive Resources:   [facility] ([email or web address]) - Visit our website to learn more about  your condition and other rheumatic diseases, and gain access to many helpful resources for them.   Disposal of Old Medications (www.[place].gov/everyday-takeback-day) - Visit the Drug Enforcement Administration  website to find a nearby location where you can properly dispose of old medications you no longer need.   Disposal of Used [place] (www.safeneedledisposal.org) - Visit the Safe Needle Disposal Organization website to  find a nearby location where you can properly dispose of used needles from your injectable medications.  Revised 6/14/2024

## 2024-12-19 ENCOUNTER — HOSPITAL ENCOUNTER (OUTPATIENT)
Dept: RADIOLOGY | Facility: MEDICAL CENTER | Age: 74
End: 2024-12-19
Attending: STUDENT IN AN ORGANIZED HEALTH CARE EDUCATION/TRAINING PROGRAM
Payer: MEDICARE

## 2024-12-19 DIAGNOSIS — H53.8 BLURRY VISION, RIGHT EYE: ICD-10-CM

## 2024-12-19 LAB
ANA PAT SER IF-IMP: NORMAL
NUCLEAR IGG SER QL IF: NORMAL

## 2024-12-19 PROCEDURE — 93880 EXTRACRANIAL BILAT STUDY: CPT

## 2024-12-20 LAB
CARBAMYLATED PROTEIN ANTIBODY, IGG Q6043: 1 UNITS (ref 0–19)
CCP IGA+IGG SERPL IA-ACNC: 2 UNITS (ref 0–19)
RHEUMATOID FACT SER NEPH-ACNC: <10 IU/ML (ref 0–14)

## 2025-01-17 ENCOUNTER — HOSPITAL ENCOUNTER (OUTPATIENT)
Dept: LAB | Facility: MEDICAL CENTER | Age: 75
End: 2025-01-17
Attending: STUDENT IN AN ORGANIZED HEALTH CARE EDUCATION/TRAINING PROGRAM
Payer: MEDICARE

## 2025-01-17 LAB
CRP SERPL HS-MCNC: 1.61 MG/DL (ref 0–0.75)
ERYTHROCYTE [SEDIMENTATION RATE] IN BLOOD BY WESTERGREN METHOD: 33 MM/HOUR (ref 0–25)

## 2025-01-17 PROCEDURE — 86140 C-REACTIVE PROTEIN: CPT

## 2025-01-17 PROCEDURE — 36415 COLL VENOUS BLD VENIPUNCTURE: CPT

## 2025-01-17 PROCEDURE — 85652 RBC SED RATE AUTOMATED: CPT

## 2025-01-18 ASSESSMENT — RHEUMATOLOGY FOLLOW-UP QUESTIONNAIRE
DURATION: <30 MINS
INSOMNIA: Y
CHARACTERISTIC: BETTER WITH ACTIVITY
ANXIETY: Y
CHIEF_COMPLAINT: FOLLOW UP VISIT
RINGING IN EARS: Y
FATIGUE: Y

## 2025-01-21 ENCOUNTER — OFFICE VISIT (OUTPATIENT)
Dept: RHEUMATOLOGY | Facility: MEDICAL CENTER | Age: 75
End: 2025-01-21
Attending: STUDENT IN AN ORGANIZED HEALTH CARE EDUCATION/TRAINING PROGRAM
Payer: MEDICARE

## 2025-01-21 VITALS
SYSTOLIC BLOOD PRESSURE: 102 MMHG | TEMPERATURE: 97.2 F | WEIGHT: 134 LBS | DIASTOLIC BLOOD PRESSURE: 54 MMHG | OXYGEN SATURATION: 96 % | RESPIRATION RATE: 14 BRPM | HEART RATE: 88 BPM | BODY MASS INDEX: 22.65 KG/M2

## 2025-01-21 DIAGNOSIS — H53.8 BLURRY VISION, RIGHT EYE: Primary | ICD-10-CM

## 2025-01-21 DIAGNOSIS — I73.00 RAYNAUD'S SYNDROME WITHOUT GANGRENE: ICD-10-CM

## 2025-01-21 DIAGNOSIS — D84.9 IMMUNOSUPPRESSED STATUS (HCC): ICD-10-CM

## 2025-01-21 DIAGNOSIS — Z79.52 LONG TERM CURRENT USE OF SYSTEMIC STEROIDS: ICD-10-CM

## 2025-01-21 PROCEDURE — 99212 OFFICE O/P EST SF 10 MIN: CPT | Performed by: STUDENT IN AN ORGANIZED HEALTH CARE EDUCATION/TRAINING PROGRAM

## 2025-01-21 PROCEDURE — 3078F DIAST BP <80 MM HG: CPT | Performed by: STUDENT IN AN ORGANIZED HEALTH CARE EDUCATION/TRAINING PROGRAM

## 2025-01-21 PROCEDURE — 3074F SYST BP LT 130 MM HG: CPT | Performed by: STUDENT IN AN ORGANIZED HEALTH CARE EDUCATION/TRAINING PROGRAM

## 2025-01-21 PROCEDURE — 99214 OFFICE O/P EST MOD 30 MIN: CPT | Performed by: STUDENT IN AN ORGANIZED HEALTH CARE EDUCATION/TRAINING PROGRAM

## 2025-01-21 ASSESSMENT — PATIENT HEALTH QUESTIONNAIRE - PHQ9: CLINICAL INTERPRETATION OF PHQ2 SCORE: 0

## 2025-01-21 NOTE — LETTER
January 21, 2025         Patient: Jovana Black   YOB: 1950   Date of Visit: 1/21/2025       To Whom it May Concern:    Jovana Black has been under my care for ongoing management of an inflammatory condition that could affect intra or extra cranial vessels. She is on immunosuppression with methotrexate and prednisone with evidence of well controlled disease on recent laboratory studies indicating low risk for a flare.     If you have any questions or concerns, please don't hesitate to call.    Sincerely,     Heidi Diego D.O.  Electronically Signed

## 2025-01-21 NOTE — PROGRESS NOTES
[facility] RHEUMATOLOGY  [address], [address], [place] [address]  Phone: [phone] ? Fax: [phone]  [facility]/Health-Services/Rheumatology    FOLLOW-UP VISIT NOTE      DATE OF SERVICE: 01/21/2025         Subjective     PRIMARY CARE PRACTITIONER:  Cheryl Irizarry M.D.  [address]  [place] [address]    PATIENT IDENTIFICATION:  Jovana Black  [address] [address]    YOB: 1950    MEDICAL RECORD NUMBER: [id number]          CHIEF COMPLAINT:   Chief Complaint   Patient presents with    Follow-Up     Blurry vision, right eye       RHEUMATOLOGIC HISTORY:  Jovana Black is a 74 y.o. female with pertinent history notable macular degeneration who presents for follow up.    Initial consult 12/2024:  Patient developed sudden onset of R eye blurry vision (could not see well in the center) in early 11/2024.  No associated vision loss.  Prior to onset of ocular symptoms, had stiffness in the R jaw that lasted for at least 1 week (thought it might be stress from the election) but did not have pain with chewing or tongue pain. She did not have bitemporal headaches, scalp tenderness, arm claudication, fevers, unintentional weight loss, chills, neck pain, b/l shoulder or hip pain. She was evaluated by her ophthalmologist who suspected possible GCA given elevated inflammatory markers (ESR 46, CRP 26) on 11/7/2024 so was started on prednisone 60 mg taper (60 mg x 5 days then 40 mg, 20 mg, followed by 10 mg).  R temporal artery biopsy on 11/7 did not show evidence of giant cell arteritis but focal calcifications.  Repeat inflammatory markers on 11/11/2024 were normal however, on 12/4/24, she had 2nd elevation in inflammatory markers after being off prednisone for 5 days so was restarted on prednisone but at 40 mg daily.  Reported chronic pain along b/l 5th digits on the sides but no swelling (hx of trigger finger). AM stiffness in the hands (sometimes could not make fists)  would last perhaps 15-20 mins.  Reported new onset cold induced paleness of the fingertips (L index finger) that she noticed in 2024.  Symptoms preceded numbness but no any other color changes.  Denied rashes, sicca symptoms, episodes of red painful photophobic eyes, mucosal ulcerations, pleuritic  CP, DANIELS, psoriasis, hx of stroke, DVT/PE, or diverticulitis.  She rarely drank alcohol.  She will be traveling to [place] for a hiking trip the last week of 2024 and will be there for 3 weeks.  Pertinent treatments:   Prednisone taper  Methotrexate 15 mg po weekly: 2024- present  Pertinent positive labs: 2024, ESR (48, normal in 2024), CRP (32.8 mg/L, normal in 2024 )  Pertinent negative labs: N/A  Pertinent imagin/2024 B/L hand XR: Moderate osteoarthritic degenerative changes of the hands without radiographic evidence of erosive arthropathy.   2024 temporal artery duplex US: No evidence of inflammation or stenosis seen in bilateral temporal arteries   2024 intracranial US: Unremarkable bilateral carotid Doppler ultrasound. No hemodynamically significant stenosis.   2024 R temporal artery biopsy: No evidence of GCA      INTERVAL HISTORY:  Reports interval history as noted on the questionnaire below or scanned under media tab.    Patient is doing quite well since last office visit.   No new vision symptoms.  No bitemporal headaches, scalp tenderness, jaw/arm claudication, b/l shoulder or hip pain.  No side effects from MTX but have lower abdominal pressure which she attributes to prolapsed bladder (surgical intervention is planned in the near future).   Reports some weight gain since being on steroids.   Currently on prednisone 15 mg x 2 days.   She has not had recurrent cold induced paleness of the finger tips.   Reviewed results of recent labs.     [facility] Rheumatology Established Patient History Form    2025  9:46 AM PST - Filed by Patient   MAIN REASON FOR VISIT Follow up visit    INTERVAL HISTORY OF ILLNESS   Date of worsening onset:    Preceding incident/ailment:    Describe/list your symptoms:    Exacerbating factors:    Alleviating factors:    Helpful medications:    Ineffective medications:    Severity of pain (scale of 1-10):    Personal/emotional stressors:    Jj All The Areas Of Pain    REVIEW OF SYMPTOMS    General   Fevers    Chills    Night sweats    Malaise    Fatigue Yes   Unintentional weight loss    Musculoskeletal   Joint pain    Morning stiffness duration <30 mins   Morning stiffness characteristic Better with activity   Joint swelling    Joint instability    Tendon pain    Muscle pain    Body aches    Dermatologic   Hair loss with bald spots    Hair shedding    Skin thickening    Skin plaques    Sunlight-induced skin rash    Cold-induced color changes (white, purple, red on rewarming)    Neurologic/Psychiatric   Weakness    Spasms    Tingling    Burning    Numbness    Insomnia Yes   Anxiety Yes   Depression    Head/Eyes   Headaches    Temple pain    Dizziness    Dry eyes    Eye pain    Eye redness    Blurry vision    Vision loss    Ears/Nose   Ear pain    Ringing in ears Yes   Vertigo    Hearing loss    Nasal ulcers    Nosebleeds    Sinus pain    Nasal congestion    Snoring    Mouth/Throat   Oral ulcers    Bleeding gums    Dry mouth    Cavities    Sore throat    Sticking in throat    Difficulty speaking    Neck/Lymphatics   Thyroid pain    Thyroid swelling    Lymph node swelling    Cardiac/Respiratory   Chest pain with breathing    Dry cough    Cough with bloody phlegm    Shortness of breath    Fast heartbeats    Irregular heartbeats    Gastrointestinal   Nausea    Vomiting    Difficulty swallowing    Heartburn    Abdominal pain    Bloody stool    Mucus stool    Genitourinary   Pelvic pain    Genital ulcers    Abnormal discharge    Burning urination    Frothy urine    Blood in urine        REVIEW OF SYSTEMS:  Except as noted in the history above, relevant review of  systems with emphasis on autoimmune rheumatic diseases was otherwise negative.      ACTIVE PROBLEM LIST:  Patient Active Problem List    Diagnosis Date Noted    Trigger finger 2020       PAST MEDICAL HISTORY:  Trigger finger  Macular degeneration    PAST SURGICAL HISTORY:  Past Surgical History:   Procedure Laterality Date    PB INCISE FINGER TENDON SHEATH Left 2020    Procedure: RELEASE, TRIGGER FINGER-MIDDLE;  Surgeon: Daniel Gonzalez M.D.;  Location: SURGERY [facility];  Service: Orthopedics    HAND SURGERY Right 2009    ring finger    THYROID LOBECTOMY Right     TONSILLECTOMY Bilateral        MEDICATIONS:  Current Outpatient Medications   Medication Sig    Artificial Tear Solution (GENTEAL TEARS OP)     estradiol (ESTRACE VAGINAL) 0.1 MG/GM vaginal cream     Multiple Vitamins-Minerals (PRESERVISION/LUTEIN PO)     Psyllium 0.36 g Cap Take 0.36 g by mouth every day.    folic acid (FOLVITE) 1 MG Tab Take 1 Tablet by mouth every day. Except the day of methotrexate.    methotrexate 2.5 MG tablet Take 6 Tablets by mouth every 7 days.    predniSONE (DELTASONE) 5 MG Tab Take 7 tablets for 7 days, then 6 tablets for 7 days and decreased by 5 mg every week until you complete 5 mg for 7 days which will be followed by 2.5 mg for 7 days.       ALLERGIES:   No Known Allergies    IMMUNIZATIONS:   Immunization History   Administered Date(s) Administered    PFIZER BIVALENT SARS-COV-2 VACCINE (12+) 2022    PFIZER PURPLE CAP SARS-COV-2 VACCINATION (12+) 2021, 03/10/2021, 2021, 2022       SOCIAL HISTORY:   Social History     Socioeconomic History    Marital status:    Tobacco Use    Smoking status: Former     Current packs/day: 0.00     Average packs/day: 0.5 packs/day for 10.0 years (5.0 ttl pk-yrs)     Types: Cigarettes     Start date: 1967     Quit date: 1977     Years since quittin.0    Smokeless tobacco: Never   Vaping Use    Vaping status: Never Used   Substance  and Sexual Activity    Alcohol use: Yes     Comment: reports 1/week    Drug use: Not Currently     Social Drivers of Health     Intimate Partner Violence: Not At Risk (11/6/2024)    Received from [employer] (and [employer] [place], [place], and [place] prior to 7/1/2021)    Feeling Safe      Are you in a relationship with someone who hurts you emotionally and/or physically?: No       FAMILY HISTORY: No family history of autoimmune rheumatic diseases.           Objective     Vital Signs: /54   Pulse 88   Temp 36.2 °C (97.2 °F) (Temporal)   Resp 14   Wt 60.8 kg (134 lb)   SpO2 96% Body mass index is 22.65 kg/m².    General: Appears well and comfortable  Eyes: No scleral or conjunctival lesions  Cardiovascular: Regular rate and rhythm; no pericardial rubs.  Equal distal UE pulses.  No carotid, supraclavicular, subclavian or abdominal bruits   Respiratory: Breathing quiet and unlabored  Integumentary: No significant cutaneous lesions on exposed skin  Musculoskeletal: No significant swelling, tenderness, warmth or limitations of motion at joints examined  Neurologic: Nonfocal  Psychiatric: Mood and affect appropriate      LABORATORY RESULTS REVIEWED AND INTERPRETED BY ME:  Lab Results   Component Value Date/Time    CREACTPROT 1.61 (H) 01/17/2025 11:13 AM    SEDRATEWES 33 (H) 01/17/2025 11:13 AM     Lab Results   Component Value Date/Time    RHEUMFACTN <10 12/17/2024 12:10 PM     Lab Results   Component Value Date/Time    WBC 4.5 08/30/2024 07:17 AM    RBC 4.26 08/30/2024 07:17 AM    HEMOGLOBIN 13.3 08/30/2024 07:17 AM    HEMATOCRIT 38.5 08/30/2024 07:17 AM    MCV 90.4 08/30/2024 07:17 AM    MCH 31.2 08/30/2024 07:17 AM    MCHC 34.5 08/30/2024 07:17 AM    RDW 12.8 08/30/2024 07:17 AM    PLATELETCT 293 08/30/2024 07:17 AM    MPV 9.8 08/30/2024 07:17 AM    NEUTS 2.95 08/30/2024 07:17 AM    LYMPHOCYTES 26 08/30/2024 07:17 AM    MONOCYTES 6 08/30/2024 07:17 AM    EOSINOPHILS 1 08/30/2024 07:17 AM     BASOPHILS 1 08/30/2024 07:17 AM       RADIOLOGY RESULTS REVIEWED AND INTERPRETED BY ME: See above      All relevant laboratory and imaging results reported on this note were reviewed and interpreted by me.         Assessment & Plan     Jovana Black is a 74 y.o. female with history as noted above whose presentation merits the following clinical impressions and recommendations:    1. Blurry vision, right eye  Patient had one episode of acute right blurry vision without vision loss, temporal headaches, scalp tenderness, jaw/tongue/arm claudication, or constitutional symptoms.  Given elevated inflammatory markers, she was started on prednisone 60 mg by ophthalmology due to concerns for GCA.  Right temporal artery biopsy one day after she started prednisone was normal.  While on steroids, had normalized inflammatory markers however, ESR/CRP went back up 5 days post steroid taper so was restarted on prednisone with a taper.  She has not had recurrent blurry vision and no concerns for PMR. Temporal artery ultrasound in 12/2024 was negative.  Given the possibility of skip lesions on biopsy, thought reasonable to treat for presumed GCA to err on the side of caution.  Opted for methotrexate which has been used as a steroid sparing agent in GCA over tocilizumab as she will be traveling to [place] for 3 weeks in 1/2024 so injection would not be ideal. Fortunately most recent inflammatory markers are trending down so will continue current treatment plan.    - Continue current prednisone taper  - Continue methotrexate 15 mg po weekly and folic acid 1 mg daily  - Labs prior to next visit    2. Immunosuppressed status (HCC)  Presently with no history, physical, or laboratory evidence to suggest significant adverse drug effects or opportunistic infections, but need routine monitoring labs per guidelines. No need to hold methotrexate for upcoming bladder surgery with potential hysterectomy but if her surgeon requires  it, the medication can be held one week prior to surgery and resume once when there are signs of good wound healing, no signs of infection, and sutures/staples have been removed; this is typically approximately 14 days after surgery.    3. Long term current use of systemic steroids  Counseled on the potential adverse effects of long-term steroid use, including adrenal insufficiency, decrease in bone density, skin thinning with easy bruising, and metabolic syndrome including hyperglycemia and hyperlipidemia.  - Maintenance vitamin D and calcium supplementation (2,000 IU D3 daily and calcium 1,200-1,500 mg/day including dietary sources) for bone health.     4. Raynaud's syndrome without gangrene  Stable. No trophic complications such as digital ulcerations, pitting, fissuring, gangrene and /or infarcts). Symptoms are mild and only present during colder months.   - Avoid exposure to cold temperature extremes and wear gloves/socks to keep hands/feet warm  - Consider trial of topical nitroglycerin or oral CCB depending on her clinical trajectory    The above assessment and plan were discussed with the patient who acknowledged understanding of the plan.    FOLLOW-UP: Return in about 3 months (around 4/21/2025).         Thank you for the opportunity to participate in the care of Jovana Fernandes Berthagiovanny.    Heidi Diego D.O.  Rheumatologist

## 2025-01-21 NOTE — PATIENT INSTRUCTIONS
Thank you for visiting our clinic today.     Summary of your visit:    No need to hold methotrexate for your upcoming bladder surgery with potential hysterectomy but if your surgeon requires it, the medication can be held one week prior to surgery and resume once when there are signs of good wound healing, no signs of infection, and sutures/staples have been removed; this is typically approximately 14 days after surgery.    Follow up in 3 months.     [facility] AFTER VISIT GUIDE  Below are important guidelines to help you navigate your health care needs and assist us in caring for you safely and  effectively. We encourage you to carefully read and understand this information and adhere to them accordingly.    [employer] [facility] [employer] Messaging and Phone Calls:   Diagnosis and Treatment - For a detailed explanation of your condition and treatment plan from today’s visit, refer  to the visit note on [employer] via the following steps:  o Log in to [employer] [facility] [employer] and click on “Visits” at the top.  o Scroll down to “Past Visits” under Appointments.  o Click on “View Notes” under the appropriate visit date.   Questions or Concerns - [employer] [facility] [employer] messaging is for non-urgent matters that do not require immediate attention and  should be brief with no more than two questions or concerns. If you have multiple questions or concerns, we ask that  you schedule an appointment to have them properly addressed.   Response to Messages - [employer] [facility] [employer] messages are addressed throughout the week depending on clinical availability,  so we ask that you allow up to one week for a response.   Phone Calls and Voicemails - Phone calls and voicemail messages are reserved for time-sensitive matters that  cannot wait to be addressed via [employer]. We ask that you refrain from calling the office multiple times or leaving  multiple voicemails regarding the same issue as doing so may lead to delays in response time.   Urgent Issues - For urgent medical matters or medical  emergencies that cannot wait, you are advised to go to your  nearest Urgent Care or Emergency Department for immediate attention.    Laboratory Tests and Imaging Studies:   Future Lab and Imaging Orders - We ask that you get your lab tests and imaging studies done no later than one  week before your follow-up visit unless instructed otherwise.   Results Communication - You may see some test results marked as “abnormal” that are not necessarily significant  or concerning. If there are significant abnormalities on your test results that warrant further action, you will be notified  via MyChart or phone call, otherwise they will be addressed at your follow-up visit.    Prescriptions and Refill Requests:   General Prescriptions (e.g. prednisone, hydroxychloroquine, leflunomide, methotrexate, etc.) - These are sent  to Retail Pharmacies, so all refill requests of these medications should be directed to your local pharmacy.   Specialty Prescriptions (e.g. Enbrel, Humira, Cosentyx, Xeljanz, etc.) - These are sent to Specialty Pharmacies,  so all refill requests of these medications should be directed to your designated specialty pharmacy.   Infusion Prescriptions (e.g. Remicade, Simponi Aria, Rituxan, Saphnelo, etc.) - These are sent to Outpatient  Infusion Centers, so all scheduling requests of these medications should be directed to your local infusion center.    Medication Risks and Adverse Effects:   Immunosuppressed Status - Steroids and antirheumatic drugs are immunosuppressants, so they increase the risk  of infections and can have side effects on various organ systems in your body, though most of them are uncommon.   Potential Side Effects - Be sure to read the drug package inserts to learn about the potential side effects of your  medications before you start taking them and take them exactly as prescribed unless instructed otherwise.   In Case of Side Effects - If you experience any significant side effects, stop  taking the medication immediately and  promptly notify the prescriber. Depending on the severity of the side effects, consider going to an Urgent Care or  Emergency Department for immediate attention.    Immunizations and Health Screening:   Vaccinations - If you are on immunosuppressive therapy, it is important that you are up to date on age-appropriate  immunizations, particularly shingles and pneumonia vaccines, which you can request from your primary care provider  or from us at your next appointment.   Screening Tests - It is also important that you are up to date on age-appropriate screening tests, such as pap  smear, mammography, and colonoscopy, which you can request from your primary care provider.    Educational and Supportive Resources:   [employer] [facility] ([email or web address]) - Visit our website to learn more about  your condition and other rheumatic diseases, and gain access to many helpful resources for them.   Disposal of Old Medications (www.[place].gov/everyday-takeback-day) - Visit the Drug Enforcement Administration  website to find a nearby location where you can properly dispose of old medications you no longer need.   Disposal of Used [place] (www.safeneedledisposal.org) - Visit the Safe Needle Disposal Organization website to  find a nearby location where you can properly dispose of used needles from your injectable medications.  Revised 6/14/2024

## 2025-05-12 ENCOUNTER — APPOINTMENT (OUTPATIENT)
Dept: RHEUMATOLOGY | Facility: MEDICAL CENTER | Age: 75
End: 2025-05-12
Attending: STUDENT IN AN ORGANIZED HEALTH CARE EDUCATION/TRAINING PROGRAM
Payer: MEDICARE

## (undated) DEVICE — KIT ROOM DECONTAMINATION

## (undated) DEVICE — PROTECTOR ULNA NERVE - (36PR/CA)

## (undated) DEVICE — PAD PREP 24 X 48 CUFFED - (100/CA)

## (undated) DEVICE — CHLORAPREP 26 ML APPLICATOR - ORANGE TINT(25/CA)

## (undated) DEVICE — NEPTUNE 4 PORT MANIFOLD - (20/PK)

## (undated) DEVICE — HEAD HOLDER JUNIOR/ADULT

## (undated) DEVICE — MASK ANESTHESIA ADULT  - (100/CA)

## (undated) DEVICE — LACTATED RINGERS INJ 1000 ML - (14EA/CA 60CA/PF)

## (undated) DEVICE — ELECTRODE NEEDLE NON-SAFETY 2.8 IN (150EA/CT)

## (undated) DEVICE — GLOVE BIOGEL ECLIPSE PF LATEX SIZE 8.0  (50PR/BX)

## (undated) DEVICE — SODIUM CHL IRRIGATION 0.9% 1000ML (12EA/CA)

## (undated) DEVICE — ELECTRODE 850 FOAM ADHESIVE - HYDROGEL RADIOTRNSPRNT (50/PK)

## (undated) DEVICE — SUCTION INSTRUMENT YANKAUER BULBOUS TIP W/O VENT (50EA/CA)

## (undated) DEVICE — DRAPE LARGE 3 QUARTER - (20/CA)

## (undated) DEVICE — SUTURE GENERAL

## (undated) DEVICE — GOWN WARMING STANDARD FLEX - (30/CA)

## (undated) DEVICE — SET LEADWIRE 5 LEAD BEDSIDE DISPOSABLE ECG (1SET OF 5/EA)

## (undated) DEVICE — CANISTER SUCTION 3000ML MECHANICAL FILTER AUTO SHUTOFF MEDI-VAC NONSTERILE LF DISP  (40EA/CA)

## (undated) DEVICE — BOVIE NEEDLE TIP INSULATD NON-SAFETY 2CM (50/PK)

## (undated) DEVICE — SENSOR SPO2 NEO LNCS ADHESIVE (20/BX) SEE USER NOTES

## (undated) DEVICE — ELECTRODE DUAL RETURN W/ CORD - (50/PK)

## (undated) DEVICE — PADDING CAST 4 IN STERILE - 4 X 4 YDS (24/CA)

## (undated) DEVICE — WRAP CO-FLEX 4IN X 5YD STERIL - SELF-ADHERENT (18/CA)

## (undated) DEVICE — TUBING CLEARLINK DUO-VENT - C-FLO (48EA/CA)

## (undated) DEVICE — WATER IRRIG. STER. 1500 ML - (9/CA)

## (undated) DEVICE — STOCKINET BIAS 4 IN STERILE - (20/CA)

## (undated) DEVICE — SUTURE 4-0 ETHILON FS-2 18 (36PK/BX)"

## (undated) DEVICE — SET EXTENSION WITH 2 PORTS (48EA/CA) ***PART #2C8610 IS A SUBSTITUTE*****

## (undated) DEVICE — KIT ANESTHESIA W/CIRCUIT & 3/LT BAG W/FILTER (20EA/CA)